# Patient Record
Sex: FEMALE | Race: WHITE | NOT HISPANIC OR LATINO | Employment: FULL TIME | URBAN - METROPOLITAN AREA
[De-identification: names, ages, dates, MRNs, and addresses within clinical notes are randomized per-mention and may not be internally consistent; named-entity substitution may affect disease eponyms.]

---

## 2020-11-28 LAB
DATE OF PROCEDURE: NORMAL
EMPLOYED IN HEALTHCARE: NORMAL
FIRST TEST: YES
HOSPITALIZED: NO
ICU: NO
PREGNANT ?: NORMAL
REQUIRED FOR PROCEDURE/SURGERY?: YES
RESIDES IN CONGREGATE CARE SETTING: NO
SARS-COV-2, PCR: NOT DETECTED
SPECIMEN SOURCE: NORMAL
SYMPTOMATIC AS DEFINED BY THE CDC: NO
UNIVERSITY HOSPITAL EMPLOYEE?: NORMAL

## 2020-12-01 LAB — HCG URINE: NEGATIVE

## 2020-12-08 LAB — SURGICAL PATHOLOGY REPORT: NORMAL

## 2023-04-14 ENCOUNTER — TELEMEDICINE (OUTPATIENT)
Dept: PRIMARY CARE | Facility: CLINIC | Age: 33
End: 2023-04-14
Payer: COMMERCIAL

## 2023-04-14 VITALS — HEIGHT: 71 IN | BODY MASS INDEX: 40.6 KG/M2 | WEIGHT: 290 LBS

## 2023-04-14 DIAGNOSIS — E66.01 MORBID OBESITY WITH BODY MASS INDEX (BMI) OF 40.0 TO 44.9 IN ADULT (MULTI): Primary | ICD-10-CM

## 2023-04-14 PROCEDURE — 99213 OFFICE O/P EST LOW 20 MIN: CPT | Performed by: INTERNAL MEDICINE

## 2023-04-14 RX ORDER — PYRIDOSTIGMINE BROMIDE 60 MG/1
60 TABLET ORAL DAILY
COMMUNITY
End: 2024-01-15

## 2023-04-14 RX ORDER — TIZANIDINE 4 MG/1
4 TABLET ORAL AS NEEDED
COMMUNITY
Start: 2022-01-28 | End: 2023-06-26 | Stop reason: ALTCHOICE

## 2023-04-14 RX ORDER — SERTRALINE HYDROCHLORIDE 50 MG/1
50 TABLET, FILM COATED ORAL DAILY
COMMUNITY
Start: 2020-05-22 | End: 2023-09-12 | Stop reason: DRUGHIGH

## 2023-04-14 RX ORDER — PEN NEEDLE, DIABETIC 31 GX5/16"
NEEDLE, DISPOSABLE MISCELLANEOUS
COMMUNITY
Start: 2023-02-10 | End: 2023-12-20

## 2023-04-14 RX ORDER — LIRAGLUTIDE 6 MG/ML
3 INJECTION, SOLUTION SUBCUTANEOUS DAILY
COMMUNITY
End: 2023-06-19 | Stop reason: SDUPTHER

## 2023-04-14 RX ORDER — FLUDROCORTISONE ACETATE 0.1 MG/1
0.1 TABLET ORAL DAILY
COMMUNITY
End: 2023-12-22 | Stop reason: SDUPTHER

## 2023-04-14 RX ORDER — NAPROXEN 500 MG/1
500 TABLET ORAL AS NEEDED
COMMUNITY
Start: 2020-08-26

## 2023-04-14 RX ORDER — AMITRIPTYLINE HYDROCHLORIDE 10 MG/1
10 TABLET, FILM COATED ORAL DAILY
COMMUNITY
Start: 2017-12-13 | End: 2023-10-31

## 2023-04-14 RX ORDER — FLUTICASONE PROPIONATE AND SALMETEROL 250; 50 UG/1; UG/1
1 POWDER RESPIRATORY (INHALATION) EVERY 12 HOURS
COMMUNITY
Start: 2022-09-02 | End: 2023-12-20

## 2023-04-14 RX ORDER — ALBUTEROL SULFATE 90 UG/1
2 AEROSOL, METERED RESPIRATORY (INHALATION) 4 TIMES DAILY
COMMUNITY
Start: 2022-01-04

## 2023-04-14 ASSESSMENT — PATIENT HEALTH QUESTIONNAIRE - PHQ9
1. LITTLE INTEREST OR PLEASURE IN DOING THINGS: NOT AT ALL
SUM OF ALL RESPONSES TO PHQ9 QUESTIONS 1 AND 2: 0
2. FEELING DOWN, DEPRESSED OR HOPELESS: NOT AT ALL

## 2023-04-14 NOTE — PATIENT INSTRUCTIONS
We will switch to Wegovy,Saxenda hasn't been significantly effective.  Follow up in 3 months.I spent >15 minutes face to face with individual providing recommendations for nutrition choices and exercise plan to help achieve weight reduction.

## 2023-04-14 NOTE — PROGRESS NOTES
"Subjective   Patient ID: Charlotte Crain is a 33 y.o. female who presents for No chief complaint on file..    pt is seen virtually to follow up on obesity,she started Saxenda over 3 months ago tolerated well,and is now on 3 mg dose,,she has been paying out of pocket,her mother helping her out with payment.No side effects.she was only able to loose 10 pounds.she has not been exercising,working in ER night shift and long hours,but will start walking regularly with the nicer weather now.  She is asking ti switch to Wegovy for better effect.  She saw bariatric surgery,they would not cover  the balloon procedure,which she prefer,they will cover the bariatric syrgery,which she is reluctant to do,:she does not nor her mother to loose lots of weight\"  f/u on anxiety,doing well on Sertraline.             Review of Systems   Reason unable to perform ROS: as HPI.       Objective   Ht 1.803 m (5' 11\")   Wt 132 kg (290 lb)   BMI 40.45 kg/m²     Physical Exam  Constitutional:       General: She is not in acute distress.  Pulmonary:      Effort: No respiratory distress.   Neurological:      Mental Status: She is alert.       Assessment/Plan          "

## 2023-04-23 DIAGNOSIS — J22 UNSPECIFIED ACUTE LOWER RESPIRATORY INFECTION: ICD-10-CM

## 2023-04-26 RX ORDER — FLUTICASONE PROPIONATE 50 MCG
SPRAY, SUSPENSION (ML) NASAL
Qty: 16 ML | Refills: 3 | Status: SHIPPED | OUTPATIENT
Start: 2023-04-26 | End: 2023-10-17

## 2023-04-27 ENCOUNTER — TELEPHONE (OUTPATIENT)
Dept: PRIMARY CARE | Facility: CLINIC | Age: 33
End: 2023-04-27
Payer: COMMERCIAL

## 2023-04-27 DIAGNOSIS — L98.9 LEG SORE: Primary | ICD-10-CM

## 2023-04-27 RX ORDER — MUPIROCIN 20 MG/G
OINTMENT TOPICAL 3 TIMES DAILY
Qty: 22 G | Refills: 0 | Status: SHIPPED | OUTPATIENT
Start: 2023-04-27 | End: 2023-05-07

## 2023-04-27 NOTE — TELEPHONE ENCOUNTER
Micaela, patient's mother in calling on behalf of patient. Patient has sores all over leg and her friend that is a nurse advised that she put Mupirocin ointment on them to help. Patient would like an rx prescribed for this. Please advise.

## 2023-05-15 ENCOUNTER — TELEPHONE (OUTPATIENT)
Dept: PRIMARY CARE | Facility: CLINIC | Age: 33
End: 2023-05-15
Payer: COMMERCIAL

## 2023-05-15 NOTE — TELEPHONE ENCOUNTER
Patient got her Weigovie prescription, but it was not covered by her insurance. Patient is following up to see if a prior authorization can be done if possible. Please advise. OK to leave a message.

## 2023-05-21 ENCOUNTER — PATIENT MESSAGE (OUTPATIENT)
Dept: PRIMARY CARE | Facility: CLINIC | Age: 33
End: 2023-05-21
Payer: COMMERCIAL

## 2023-05-21 DIAGNOSIS — R73.09 BLOOD GLUCOSE ABNORMAL: Primary | ICD-10-CM

## 2023-05-26 ENCOUNTER — LAB (OUTPATIENT)
Dept: LAB | Facility: LAB | Age: 33
End: 2023-05-26
Payer: COMMERCIAL

## 2023-05-26 DIAGNOSIS — R73.09 BLOOD GLUCOSE ABNORMAL: ICD-10-CM

## 2023-05-26 LAB
ESTIMATED AVERAGE GLUCOSE FOR HBA1C: 97 MG/DL
FASTING GLUCOSE (MG/DL) IN SER/PLAS: 90 MG/DL (ref 74–99)
HEMOGLOBIN A1C/HEMOGLOBIN TOTAL IN BLOOD: 5 %

## 2023-05-26 PROCEDURE — 82947 ASSAY GLUCOSE BLOOD QUANT: CPT

## 2023-05-26 PROCEDURE — 36415 COLL VENOUS BLD VENIPUNCTURE: CPT

## 2023-05-26 PROCEDURE — 83036 HEMOGLOBIN GLYCOSYLATED A1C: CPT

## 2023-06-19 DIAGNOSIS — E66.01 MORBID OBESITY WITH BODY MASS INDEX (BMI) OF 40.0 TO 44.9 IN ADULT (MULTI): Primary | ICD-10-CM

## 2023-06-19 RX ORDER — LIRAGLUTIDE 6 MG/ML
3 INJECTION, SOLUTION SUBCUTANEOUS DAILY
Qty: 6 ML | Refills: 0 | Status: SHIPPED | OUTPATIENT
Start: 2023-06-19 | End: 2023-06-26 | Stop reason: ALTCHOICE

## 2023-06-26 ENCOUNTER — OFFICE VISIT (OUTPATIENT)
Dept: PRIMARY CARE | Facility: CLINIC | Age: 33
End: 2023-06-26
Payer: COMMERCIAL

## 2023-06-26 VITALS
HEART RATE: 86 BPM | OXYGEN SATURATION: 96 % | DIASTOLIC BLOOD PRESSURE: 84 MMHG | BODY MASS INDEX: 41.02 KG/M2 | HEIGHT: 71 IN | SYSTOLIC BLOOD PRESSURE: 124 MMHG | TEMPERATURE: 97.9 F | WEIGHT: 293 LBS

## 2023-06-26 DIAGNOSIS — J22 ACUTE RESPIRATORY INFECTION: ICD-10-CM

## 2023-06-26 DIAGNOSIS — Z00.00 ANNUAL PHYSICAL EXAM: ICD-10-CM

## 2023-06-26 DIAGNOSIS — G43.809 OTHER MIGRAINE WITHOUT STATUS MIGRAINOSUS, NOT INTRACTABLE: ICD-10-CM

## 2023-06-26 DIAGNOSIS — E66.01 CLASS 3 SEVERE OBESITY DUE TO EXCESS CALORIES WITHOUT SERIOUS COMORBIDITY WITH BODY MASS INDEX (BMI) OF 40.0 TO 44.9 IN ADULT (MULTI): Primary | ICD-10-CM

## 2023-06-26 PROBLEM — M54.2 CERVICALGIA: Status: ACTIVE | Noted: 2023-06-26

## 2023-06-26 PROBLEM — D21.12 BENIGN NEOPLASM OF CONNECTIVE AND OTHER SOFT TISSUE OF LEFT UPPER LIMB, INCLUDING SHOULDER: Status: ACTIVE | Noted: 2020-11-04

## 2023-06-26 PROBLEM — R93.5 ABNORMAL CT OF THE ABDOMEN: Status: ACTIVE | Noted: 2023-06-26

## 2023-06-26 PROBLEM — E66.813 CLASS 3 SEVERE OBESITY DUE TO EXCESS CALORIES WITHOUT SERIOUS COMORBIDITY WITH BODY MASS INDEX (BMI) OF 40.0 TO 44.9 IN ADULT: Status: ACTIVE | Noted: 2023-06-26

## 2023-06-26 PROBLEM — S06.0XAA BRAIN CONCUSSION: Status: ACTIVE | Noted: 2023-06-26

## 2023-06-26 PROBLEM — R10.9 ABDOMINAL PAIN: Status: ACTIVE | Noted: 2023-06-26

## 2023-06-26 PROBLEM — N94.6 DYSMENORRHEA: Status: ACTIVE | Noted: 2023-06-26

## 2023-06-26 PROBLEM — G43.909 MIGRAINE WITHOUT STATUS MIGRAINOSUS, NOT INTRACTABLE: Status: ACTIVE | Noted: 2023-06-26

## 2023-06-26 LAB — POC RAPID STREP: NEGATIVE

## 2023-06-26 PROCEDURE — 3008F BODY MASS INDEX DOCD: CPT | Performed by: INTERNAL MEDICINE

## 2023-06-26 PROCEDURE — 99214 OFFICE O/P EST MOD 30 MIN: CPT | Performed by: INTERNAL MEDICINE

## 2023-06-26 PROCEDURE — 1036F TOBACCO NON-USER: CPT | Performed by: INTERNAL MEDICINE

## 2023-06-26 PROCEDURE — 87880 STREP A ASSAY W/OPTIC: CPT | Performed by: INTERNAL MEDICINE

## 2023-06-26 RX ORDER — TOPIRAMATE 25 MG/1
25 TABLET ORAL 2 TIMES DAILY
Qty: 60 TABLET | Refills: 5 | Status: SHIPPED | OUTPATIENT
Start: 2023-06-26 | End: 2023-08-08

## 2023-06-26 ASSESSMENT — PATIENT HEALTH QUESTIONNAIRE - PHQ9
SUM OF ALL RESPONSES TO PHQ9 QUESTIONS 1 AND 2: 0
1. LITTLE INTEREST OR PLEASURE IN DOING THINGS: NOT AT ALL
2. FEELING DOWN, DEPRESSED OR HOPELESS: NOT AT ALL

## 2023-06-26 NOTE — PROGRESS NOTES
"Subjective   Patient ID: Charlotte Crain is a 33 y.o. female who presents for medication follow up.    C/o sore throat and bilateral ear pain since yesterday,no fever,chills,she had diarrhea last night,no N/V no myalgia.has clear post nasal drainage,she works at [employer] .  Today's covid test was negative today.  She is also here to discuss her weight,she took Saxenda for 6 months and paid out of pocket without relief.  She took Wegovy for 1 month,but no longer available at the pharmacy.  She had liposuction procedure by Dr Atkinson.         Review of Systems   Reason unable to perform ROS: as HPI.       Objective   /84   Pulse 86   Temp 36.6 °C (97.9 °F)   Ht 1.803 m (5' 11\")   Wt 138 kg (305 lb 3.2 oz)   SpO2 96%   BMI 42.57 kg/m²     Physical Exam  Constitutional:       Appearance: Normal appearance.   HENT:      Head: Normocephalic and atraumatic.      Right Ear: Tympanic membrane, ear canal and external ear normal.      Left Ear: Tympanic membrane, ear canal and external ear normal.      Mouth/Throat:      Pharynx: Posterior oropharyngeal erythema present. No oropharyngeal exudate.   Eyes:      Extraocular Movements: Extraocular movements intact.      Pupils: Pupils are equal, round, and reactive to light.   Cardiovascular:      Rate and Rhythm: Normal rate and regular rhythm.      Heart sounds: Normal heart sounds. No murmur heard.  Pulmonary:      Effort: Pulmonary effort is normal.      Breath sounds: Normal breath sounds. No wheezing or rhonchi.   Abdominal:      General: There is no distension.   Musculoskeletal:         General: Normal range of motion.      Cervical back: Normal range of motion and neck supple.      Left lower leg: No edema.   Lymphadenopathy:      Cervical: No cervical adenopathy.   Skin:     General: Skin is warm.   Neurological:      General: No focal deficit present.      Mental Status: She is alert and oriented to person, place, and time.   Psychiatric:         Mood and " Affect: Mood normal.         Behavior: Behavior normal.         Assessment/Plan   Problem List Items Addressed This Visit       Acute respiratory infection     Suspect viral.  Rapid strep done at the office and was negative.  Rest,push fluid,gargle with warm salty water.  Use Flonase,sudafed PRN.  Call if not better.  Use bedside humidifier.         Relevant Orders    POCT Rapid Strep A manually resulted (Completed)    Class 3 severe obesity due to excess calories without serious comorbidity with body mass index (BMI) of 40.0 to 44.9 in adult (CMS/[facility]) - Primary     Continue diet and exercise.  Wegovy on back order.  Will start Topamax,this will also help her migraine and her weight as well.  Follow up in 1 month.  Order labs including thyroid test.         Migraine without status migrainosus, not intractable    Relevant Medications    topiramate (Topamax) 25 mg tablet    Annual physical exam    Relevant Orders    CBC and Auto Differential    Comprehensive Metabolic Panel    Lipid Panel    TSH with reflex to Free T4 if abnormal

## 2023-06-26 NOTE — ASSESSMENT & PLAN NOTE
Continue diet and exercise.  Wegovy on back order.  Will start Topamax,this will also help her migraine and her weight as well.  Follow up in 1 month.  Order labs including thyroid test.

## 2023-06-26 NOTE — ASSESSMENT & PLAN NOTE
Suspect viral.  Rapid strep done at the office and was negative.  Rest,push fluid,gargle with warm salty water.  Use Flonase,sudafed PRN.  Call if not better.  Use bedside humidifier.

## 2023-07-28 ENCOUNTER — APPOINTMENT (OUTPATIENT)
Dept: PRIMARY CARE | Facility: CLINIC | Age: 33
End: 2023-07-28
Payer: COMMERCIAL

## 2023-08-04 ENCOUNTER — LAB (OUTPATIENT)
Dept: LAB | Facility: LAB | Age: 33
End: 2023-08-04
Payer: COMMERCIAL

## 2023-08-04 DIAGNOSIS — Z00.00 ANNUAL PHYSICAL EXAM: ICD-10-CM

## 2023-08-04 LAB
ALANINE AMINOTRANSFERASE (SGPT) (U/L) IN SER/PLAS: 21 U/L (ref 7–45)
ALBUMIN (G/DL) IN SER/PLAS: 4.2 G/DL (ref 3.4–5)
ALKALINE PHOSPHATASE (U/L) IN SER/PLAS: 87 U/L (ref 33–110)
ANION GAP IN SER/PLAS: 13 MMOL/L (ref 10–20)
ASPARTATE AMINOTRANSFERASE (SGOT) (U/L) IN SER/PLAS: 17 U/L (ref 9–39)
BASOPHILS (10*3/UL) IN BLOOD BY AUTOMATED COUNT: 0.06 X10E9/L (ref 0–0.1)
BASOPHILS/100 LEUKOCYTES IN BLOOD BY AUTOMATED COUNT: 0.8 % (ref 0–2)
BILIRUBIN TOTAL (MG/DL) IN SER/PLAS: 0.7 MG/DL (ref 0–1.2)
CALCIUM (MG/DL) IN SER/PLAS: 9.2 MG/DL (ref 8.6–10.3)
CARBON DIOXIDE, TOTAL (MMOL/L) IN SER/PLAS: 24 MMOL/L (ref 21–32)
CHLORIDE (MMOL/L) IN SER/PLAS: 103 MMOL/L (ref 98–107)
CHOLESTEROL (MG/DL) IN SER/PLAS: 174 MG/DL (ref 0–199)
CHOLESTEROL IN HDL (MG/DL) IN SER/PLAS: 57.8 MG/DL
CHOLESTEROL/HDL RATIO: 3
CREATININE (MG/DL) IN SER/PLAS: 0.71 MG/DL (ref 0.5–1.05)
EOSINOPHILS (10*3/UL) IN BLOOD BY AUTOMATED COUNT: 0.04 X10E9/L (ref 0–0.7)
EOSINOPHILS/100 LEUKOCYTES IN BLOOD BY AUTOMATED COUNT: 0.5 % (ref 0–6)
ERYTHROCYTE DISTRIBUTION WIDTH (RATIO) BY AUTOMATED COUNT: 13.2 % (ref 11.5–14.5)
ERYTHROCYTE MEAN CORPUSCULAR HEMOGLOBIN CONCENTRATION (G/DL) BY AUTOMATED: 33.4 G/DL (ref 32–36)
ERYTHROCYTE MEAN CORPUSCULAR VOLUME (FL) BY AUTOMATED COUNT: 87 FL (ref 80–100)
ERYTHROCYTES (10*6/UL) IN BLOOD BY AUTOMATED COUNT: 4.6 X10E12/L (ref 4–5.2)
GFR FEMALE: >90 ML/MIN/1.73M2
GLUCOSE (MG/DL) IN SER/PLAS: 93 MG/DL (ref 74–99)
HEMATOCRIT (%) IN BLOOD BY AUTOMATED COUNT: 40.1 % (ref 36–46)
HEMOGLOBIN (G/DL) IN BLOOD: 13.4 G/DL (ref 12–16)
IMMATURE GRANULOCYTES/100 LEUKOCYTES IN BLOOD BY AUTOMATED COUNT: 0.1 % (ref 0–0.9)
LDL: 95 MG/DL (ref 0–99)
LEUKOCYTES (10*3/UL) IN BLOOD BY AUTOMATED COUNT: 7.6 X10E9/L (ref 4.4–11.3)
LYMPHOCYTES (10*3/UL) IN BLOOD BY AUTOMATED COUNT: 3.89 X10E9/L (ref 1.2–4.8)
LYMPHOCYTES/100 LEUKOCYTES IN BLOOD BY AUTOMATED COUNT: 51 % (ref 13–44)
MONOCYTES (10*3/UL) IN BLOOD BY AUTOMATED COUNT: 0.64 X10E9/L (ref 0.1–1)
MONOCYTES/100 LEUKOCYTES IN BLOOD BY AUTOMATED COUNT: 8.4 % (ref 2–10)
NEUTROPHILS (10*3/UL) IN BLOOD BY AUTOMATED COUNT: 2.99 X10E9/L (ref 1.2–7.7)
NEUTROPHILS/100 LEUKOCYTES IN BLOOD BY AUTOMATED COUNT: 39.2 % (ref 40–80)
NRBC (PER 100 WBCS) BY AUTOMATED COUNT: 0 /100 WBC (ref 0–0)
PLATELETS (10*3/UL) IN BLOOD AUTOMATED COUNT: 244 X10E9/L (ref 150–450)
POTASSIUM (MMOL/L) IN SER/PLAS: 4 MMOL/L (ref 3.5–5.3)
PROTEIN TOTAL: 6.9 G/DL (ref 6.4–8.2)
SODIUM (MMOL/L) IN SER/PLAS: 136 MMOL/L (ref 136–145)
THYROTROPIN (MIU/L) IN SER/PLAS BY DETECTION LIMIT <= 0.05 MIU/L: 3.72 MIU/L (ref 0.44–3.98)
TRIGLYCERIDE (MG/DL) IN SER/PLAS: 107 MG/DL (ref 0–149)
UREA NITROGEN (MG/DL) IN SER/PLAS: 12 MG/DL (ref 6–23)
VLDL: 21 MG/DL (ref 0–40)

## 2023-08-04 PROCEDURE — 80053 COMPREHEN METABOLIC PANEL: CPT

## 2023-08-04 PROCEDURE — 85025 COMPLETE CBC W/AUTO DIFF WBC: CPT

## 2023-08-04 PROCEDURE — 36415 COLL VENOUS BLD VENIPUNCTURE: CPT

## 2023-08-04 PROCEDURE — 84443 ASSAY THYROID STIM HORMONE: CPT

## 2023-08-04 PROCEDURE — 80061 LIPID PANEL: CPT

## 2023-08-08 ENCOUNTER — TELEPHONE (OUTPATIENT)
Dept: PRIMARY CARE | Facility: CLINIC | Age: 33
End: 2023-08-08

## 2023-08-08 ENCOUNTER — OFFICE VISIT (OUTPATIENT)
Dept: PRIMARY CARE | Facility: CLINIC | Age: 33
End: 2023-08-08
Payer: COMMERCIAL

## 2023-08-08 VITALS
TEMPERATURE: 98.2 F | BODY MASS INDEX: 41.02 KG/M2 | OXYGEN SATURATION: 98 % | HEART RATE: 82 BPM | WEIGHT: 293 LBS | HEIGHT: 71 IN | SYSTOLIC BLOOD PRESSURE: 104 MMHG | DIASTOLIC BLOOD PRESSURE: 74 MMHG

## 2023-08-08 DIAGNOSIS — F41.1 GENERALIZED ANXIETY DISORDER: ICD-10-CM

## 2023-08-08 DIAGNOSIS — G43.809 OTHER MIGRAINE WITHOUT STATUS MIGRAINOSUS, NOT INTRACTABLE: ICD-10-CM

## 2023-08-08 DIAGNOSIS — E66.01 CLASS 3 SEVERE OBESITY DUE TO EXCESS CALORIES WITHOUT SERIOUS COMORBIDITY WITH BODY MASS INDEX (BMI) OF 40.0 TO 44.9 IN ADULT (MULTI): Primary | ICD-10-CM

## 2023-08-08 DIAGNOSIS — D69.3 IDIOPATHIC THROMBOCYTOPENIC PURPURA (MULTI): ICD-10-CM

## 2023-08-08 PROBLEM — G43.909 MIGRAINES: Status: ACTIVE | Noted: 2023-08-08

## 2023-08-08 PROBLEM — R10.2 FEMALE PELVIC PAIN: Status: ACTIVE | Noted: 2023-08-08

## 2023-08-08 PROBLEM — E66.9 OBESITY: Status: ACTIVE | Noted: 2023-08-08

## 2023-08-08 PROBLEM — R42 POSTURAL DIZZINESS: Status: ACTIVE | Noted: 2023-08-08

## 2023-08-08 PROBLEM — R51.9 CHRONIC HEADACHES: Status: ACTIVE | Noted: 2023-08-08

## 2023-08-08 PROBLEM — R10.31 RIGHT GROIN PAIN: Status: ACTIVE | Noted: 2023-08-08

## 2023-08-08 PROBLEM — N80.9 ENDOMETRIOSIS: Status: ACTIVE | Noted: 2018-07-19

## 2023-08-08 PROBLEM — N92.6 IRREGULAR MENSTRUATION: Status: ACTIVE | Noted: 2023-08-08

## 2023-08-08 PROBLEM — R20.9 SENSORY DISTURBANCE: Status: ACTIVE | Noted: 2023-08-08

## 2023-08-08 PROBLEM — S93.419A SPRAIN OF CALCANEOFIBULAR LIGAMENT: Status: ACTIVE | Noted: 2021-08-26

## 2023-08-08 PROBLEM — R16.0 ENLARGED LIVER: Status: ACTIVE | Noted: 2023-08-08

## 2023-08-08 PROBLEM — R09.81 NASAL CONGESTION: Status: ACTIVE | Noted: 2023-08-08

## 2023-08-08 PROBLEM — R06.2 WHEEZING: Status: ACTIVE | Noted: 2023-08-08

## 2023-08-08 PROBLEM — J02.9 SORE THROAT: Status: ACTIVE | Noted: 2023-08-08
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
## 2023-08-08 PROBLEM — R55 VASOVAGAL EPISODE: Status: ACTIVE | Noted: 2023-08-08

## 2023-08-08 PROBLEM — N91.0 MENSTRUATION DELAY: Status: ACTIVE | Noted: 2023-08-08

## 2023-08-08 PROBLEM — D21.10: Status: ACTIVE | Noted: 2020-11-04

## 2023-08-08 PROBLEM — G43.829 MENSTRUAL MIGRAINE WITHOUT STATUS MIGRAINOSUS, NOT INTRACTABLE: Status: ACTIVE | Noted: 2023-08-08

## 2023-08-08 PROBLEM — L08.9 SKIN INFECTION: Status: ACTIVE | Noted: 2023-08-08

## 2023-08-08 PROBLEM — G60.9 IDIOPATHIC PERIPHERAL NEUROPATHY: Status: ACTIVE | Noted: 2023-08-08

## 2023-08-08 PROBLEM — R91.1 INCIDENTAL LUNG NODULE, LESS THAN OR EQUAL TO 3MM: Status: ACTIVE | Noted: 2023-08-08

## 2023-08-08 PROBLEM — R31.0 GROSS HEMATURIA: Status: ACTIVE | Noted: 2023-08-08

## 2023-08-08 PROBLEM — R53.83 FATIGUE: Status: ACTIVE | Noted: 2023-08-08

## 2023-08-08 PROBLEM — N93.9 ABNORMAL UTERINE BLEEDING (AUB): Status: ACTIVE | Noted: 2023-08-08

## 2023-08-08 PROBLEM — E66.813 OBESITY, CLASS III, BMI 40-49.9 (MORBID OBESITY): Status: ACTIVE | Noted: 2018-08-03

## 2023-08-08 PROBLEM — R05.9 COUGH: Status: ACTIVE | Noted: 2023-08-08

## 2023-08-08 PROBLEM — B37.0 ORAL THRUSH: Status: ACTIVE | Noted: 2023-08-08

## 2023-08-08 PROBLEM — I95.1 ORTHOSTATIC HYPOTENSION: Status: ACTIVE | Noted: 2023-08-08

## 2023-08-08 PROBLEM — J30.2 SEASONAL ALLERGIC RHINITIS: Status: ACTIVE | Noted: 2023-08-08

## 2023-08-08 PROBLEM — B08.4 HAND, FOOT AND MOUTH DISEASE (HFMD): Status: ACTIVE | Noted: 2023-08-08

## 2023-08-08 PROBLEM — M54.50 LOW BACK PAIN: Status: ACTIVE | Noted: 2023-08-08

## 2023-08-08 PROBLEM — S06.0XAA CONCUSSION: Status: ACTIVE | Noted: 2023-08-08

## 2023-08-08 PROBLEM — G90.A POSTURAL ORTHOSTATIC TACHYCARDIA SYNDROME: Status: ACTIVE | Noted: 2023-08-08

## 2023-08-08 PROBLEM — R22.32 MASS OF LEFT UPPER EXTREMITY: Status: ACTIVE | Noted: 2023-08-08

## 2023-08-08 PROBLEM — J01.90 ACUTE SINUSITIS: Status: ACTIVE | Noted: 2023-08-08

## 2023-08-08 PROBLEM — G89.29 CHRONIC HEADACHES: Status: ACTIVE | Noted: 2023-08-08

## 2023-08-08 PROBLEM — N76.0 VAGINITIS: Status: ACTIVE | Noted: 2023-08-08

## 2023-08-08 PROBLEM — J30.9 ALLERGIC RHINITIS: Status: ACTIVE | Noted: 2023-08-08

## 2023-08-08 PROBLEM — R00.0 TACHYCARDIA: Status: ACTIVE | Noted: 2023-08-08

## 2023-08-08 PROCEDURE — 3008F BODY MASS INDEX DOCD: CPT | Performed by: INTERNAL MEDICINE

## 2023-08-08 PROCEDURE — 1036F TOBACCO NON-USER: CPT | Performed by: INTERNAL MEDICINE

## 2023-08-08 PROCEDURE — 99214 OFFICE O/P EST MOD 30 MIN: CPT | Performed by: INTERNAL MEDICINE

## 2023-08-08 RX ORDER — PHENTERMINE HYDROCHLORIDE 30 MG/1
30 CAPSULE ORAL
Qty: 30 CAPSULE | Refills: 0 | Status: SHIPPED | OUTPATIENT
Start: 2023-08-08 | End: 2023-09-12 | Stop reason: SDUPTHER

## 2023-08-08 ASSESSMENT — PATIENT HEALTH QUESTIONNAIRE - PHQ9
SUM OF ALL RESPONSES TO PHQ9 QUESTIONS 1 AND 2: 0
2. FEELING DOWN, DEPRESSED OR HOPELESS: NOT AT ALL
1. LITTLE INTEREST OR PLEASURE IN DOING THINGS: NOT AT ALL

## 2023-08-08 NOTE — ASSESSMENT & PLAN NOTE
Start phetermin.  CS agreement signed today.  OARRS report checked and verified.  Follow up in 5 weeks.I spent >15 minutes face to face with individual providing recommendations for nutrition choices and exercise plan to help achieve weight reduction.

## 2023-08-08 NOTE — TELEPHONE ENCOUNTER
Pt of Dr Sosa's. Was just in and needed a 5 week follow up. She works for [employer] with the night shift and states an afternoon shift would work better. She did need to look at her schedule still. Please advise a possible day/time for her. Thank you!

## 2023-08-08 NOTE — PROGRESS NOTES
"Subjective   Patient ID: Charlotte Crain is a 33 y.o. female who presents for 1 month follow up.    Here to follow up on BMI.  She has not been responding to Topamax,she gained weight,  In past Saxenda helped better than Wegovy,but was too expensive and sometimes was unavailable at the pharmacy.  She is reluctant to have bariatric surgery.  She works long hours in ER.  She had liposuction procedure by Dr Atkinson.         Review of Systems   Reason unable to perform ROS: as HPI.       Objective   /74 (BP Location: Left arm, Patient Position: Sitting, BP Cuff Size: Large adult)   Pulse 82   Temp 36.8 °C (98.2 °F) (Temporal)   Ht 1.803 m (5' 11\")   Wt 140 kg (308 lb)   SpO2 98%   BMI 42.96 kg/m²     Physical Exam  Constitutional:       Appearance: Normal appearance.   HENT:      Head: Normocephalic and atraumatic.   Eyes:      Extraocular Movements: Extraocular movements intact.      Pupils: Pupils are equal, round, and reactive to light.   Cardiovascular:      Rate and Rhythm: Normal rate and regular rhythm.      Heart sounds: Normal heart sounds.   Pulmonary:      Effort: Pulmonary effort is normal.      Breath sounds: Normal breath sounds. No wheezing or rhonchi.   Abdominal:      General: Abdomen is flat. Bowel sounds are normal. There is no distension.      Palpations: Abdomen is soft.   Musculoskeletal:         General: Normal range of motion.      Cervical back: Normal range of motion and neck supple.      Right lower leg: No edema.      Left lower leg: No edema.   Skin:     General: Skin is warm.   Neurological:      General: No focal deficit present.      Mental Status: She is alert and oriented to person, place, and time.   Psychiatric:         Mood and Affect: Mood normal.         Behavior: Behavior normal.         Assessment/Plan   Problem List Items Addressed This Visit       Class 3 severe obesity due to excess calories without serious comorbidity with body mass index (BMI) of 40.0 to 44.9 " in adult (CMS/HCC) - Primary    Relevant Medications    phentermine 30 mg capsule    Migraine without status migrainosus, not intractable    Generalized anxiety disorder     Stable on current med.         Idiopathic thrombocytopenic purpura (CMS/HCC)

## 2023-08-11 ENCOUNTER — APPOINTMENT (OUTPATIENT)
Dept: PRIMARY CARE | Facility: CLINIC | Age: 33
End: 2023-08-11
Payer: COMMERCIAL

## 2023-09-12 ENCOUNTER — TELEMEDICINE (OUTPATIENT)
Dept: PRIMARY CARE | Facility: CLINIC | Age: 33
End: 2023-09-12
Payer: COMMERCIAL

## 2023-09-12 VITALS — DIASTOLIC BLOOD PRESSURE: 64 MMHG | SYSTOLIC BLOOD PRESSURE: 119 MMHG

## 2023-09-12 DIAGNOSIS — F41.1 GENERALIZED ANXIETY DISORDER: Primary | ICD-10-CM

## 2023-09-12 DIAGNOSIS — E66.01 CLASS 3 SEVERE OBESITY DUE TO EXCESS CALORIES WITHOUT SERIOUS COMORBIDITY WITH BODY MASS INDEX (BMI) OF 40.0 TO 44.9 IN ADULT (MULTI): ICD-10-CM

## 2023-09-12 PROCEDURE — 99213 OFFICE O/P EST LOW 20 MIN: CPT | Performed by: INTERNAL MEDICINE

## 2023-09-12 RX ORDER — PHENTERMINE HYDROCHLORIDE 30 MG/1
30 CAPSULE ORAL
Qty: 30 CAPSULE | Refills: 1 | Status: SHIPPED | OUTPATIENT
Start: 2023-09-12 | End: 2023-11-09

## 2023-09-12 RX ORDER — SERTRALINE HYDROCHLORIDE 100 MG/1
100 TABLET, FILM COATED ORAL DAILY
Qty: 90 TABLET | Refills: 3 | Status: SHIPPED | OUTPATIENT
Start: 2023-09-12 | End: 2024-09-11

## 2023-09-12 NOTE — PROGRESS NOTES
Subjective   Patient ID: Charlotte Crain is a 33 y.o. female who presents for Follow-up.    An interactive audio and video telecommunication system with patient real time communications between the patient (at the original site) and provider (at the distant site) was utilized to provide this telehealth service.  Verbal consent was requested and obtained from the patient at this date for a telehealth.  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  She has anxiety,on Sertraline 50 mg daily,but will increase up to 100 mg daily.  No headache.         Review of Systems   Constitutional:         As HPI.       Objective   There were no vitals taken for this visit.    Physical Exam  Constitutional:       General: She is not in acute distress.     Appearance: She is obese.   Pulmonary:      Effort: No respiratory distress.   Neurological:      Mental Status: She is alert.         Assessment/Plan   Problem List Items Addressed This Visit       Class 3 severe obesity due to excess calories without serious comorbidity with body mass index (BMI) of 40.0 to 44.9 in adult (CMS/[facility])     BMI improving with phentermine.  Continue same.  Reinforce regular exercise and calories restriction.         Relevant Medications    phentermine 30 mg capsule    Generalized anxiety disorder - Primary     Was anxious even prior to starting Phentermine due to work and school.  Will increase Sertraline up to 100 mg daily.  Follow up in 2 months.         Relevant Medications    sertraline (Zoloft) 100 mg tablet

## 2023-09-12 NOTE — ASSESSMENT & PLAN NOTE
Was anxious even prior to starting Phentermine due to work and school.  Will increase Sertraline up to 100 mg daily.  Follow up in 2 months.

## 2023-09-12 NOTE — ASSESSMENT & PLAN NOTE
BMI improving with phentermine.  Continue same.  Reinforce regular exercise and calories restriction.

## 2023-10-17 DIAGNOSIS — J22 UNSPECIFIED ACUTE LOWER RESPIRATORY INFECTION: ICD-10-CM

## 2023-10-17 RX ORDER — FLUTICASONE PROPIONATE 50 MCG
SPRAY, SUSPENSION (ML) NASAL
Qty: 48 ML | Refills: 1 | Status: SHIPPED | OUTPATIENT
Start: 2023-10-17

## 2023-10-31 DIAGNOSIS — G43.909 MIGRAINE, UNSPECIFIED, NOT INTRACTABLE, WITHOUT STATUS MIGRAINOSUS: ICD-10-CM

## 2023-10-31 RX ORDER — AMITRIPTYLINE HYDROCHLORIDE 10 MG/1
TABLET, FILM COATED ORAL
Qty: 270 TABLET | Refills: 3 | Status: SHIPPED | OUTPATIENT
Start: 2023-10-31

## 2023-11-09 DIAGNOSIS — E66.01 CLASS 3 SEVERE OBESITY DUE TO EXCESS CALORIES WITHOUT SERIOUS COMORBIDITY WITH BODY MASS INDEX (BMI) OF 40.0 TO 44.9 IN ADULT (MULTI): ICD-10-CM

## 2023-11-09 RX ORDER — PHENTERMINE HYDROCHLORIDE 30 MG/1
30 CAPSULE ORAL
Qty: 30 CAPSULE | Refills: 0 | Status: SHIPPED | OUTPATIENT
Start: 2023-11-09 | End: 2024-03-25

## 2023-12-03 DIAGNOSIS — N80.9 ENDOMETRIOSIS: Primary | ICD-10-CM

## 2023-12-04 RX ORDER — NORETHINDRONE 5 MG/1
5 TABLET ORAL DAILY
Qty: 30 TABLET | Refills: 1 | Status: SHIPPED | OUTPATIENT
Start: 2023-12-04

## 2023-12-15 DIAGNOSIS — E66.01 CLASS 3 SEVERE OBESITY DUE TO EXCESS CALORIES WITHOUT SERIOUS COMORBIDITY WITH BODY MASS INDEX (BMI) OF 40.0 TO 44.9 IN ADULT (MULTI): ICD-10-CM

## 2023-12-15 DIAGNOSIS — I49.8 OTHER SPECIFIED CARDIAC ARRHYTHMIAS: ICD-10-CM

## 2023-12-18 RX ORDER — PHENTERMINE HYDROCHLORIDE 30 MG/1
30 CAPSULE ORAL
Qty: 30 CAPSULE | Refills: 0 | OUTPATIENT
Start: 2023-12-18 | End: 2024-03-17

## 2023-12-18 RX ORDER — FLUDROCORTISONE ACETATE 0.1 MG/1
0.1 TABLET ORAL DAILY
Qty: 90 TABLET | Refills: 3 | OUTPATIENT
Start: 2023-12-18

## 2023-12-21 ENCOUNTER — TELEPHONE (OUTPATIENT)
Dept: PRIMARY CARE | Facility: CLINIC | Age: 33
End: 2023-12-21
Payer: COMMERCIAL

## 2023-12-21 DIAGNOSIS — I95.1 ORTHOSTATIC HYPOTENSION: Primary | ICD-10-CM

## 2023-12-21 NOTE — TELEPHONE ENCOUNTER
Ok for Friday - pt needs refill for fludrocortisone - sent to [id number] [place]

## 2023-12-22 RX ORDER — FLUDROCORTISONE ACETATE 0.1 MG/1
0.1 TABLET ORAL DAILY
Qty: 90 TABLET | Refills: 3 | Status: SHIPPED | OUTPATIENT
Start: 2023-12-22 | End: 2024-12-21

## 2024-01-13 DIAGNOSIS — I49.8 OTHER SPECIFIED CARDIAC ARRHYTHMIAS: ICD-10-CM

## 2024-01-15 RX ORDER — PYRIDOSTIGMINE BROMIDE 60 MG/1
TABLET ORAL
Qty: 90 TABLET | Refills: 0 | Status: SHIPPED | OUTPATIENT
Start: 2024-01-15 | End: 2024-03-26

## 2024-03-25 ENCOUNTER — TELEMEDICINE (OUTPATIENT)
Dept: PRIMARY CARE | Facility: CLINIC | Age: 34
End: 2024-03-25
Payer: COMMERCIAL

## 2024-03-25 VITALS — DIASTOLIC BLOOD PRESSURE: 72 MMHG | SYSTOLIC BLOOD PRESSURE: 113 MMHG

## 2024-03-25 DIAGNOSIS — J01.00 ACUTE NON-RECURRENT MAXILLARY SINUSITIS: Primary | ICD-10-CM

## 2024-03-25 DIAGNOSIS — D69.3 IDIOPATHIC THROMBOCYTOPENIC PURPURA (MULTI): ICD-10-CM

## 2024-03-25 PROBLEM — M76.829 TIBIALIS TENDINITIS: Status: ACTIVE | Noted: 2024-01-09

## 2024-03-25 PROBLEM — M25.572 PAIN IN LEFT ANKLE AND JOINTS OF LEFT FOOT: Status: ACTIVE | Noted: 2023-10-17

## 2024-03-25 PROCEDURE — 1036F TOBACCO NON-USER: CPT | Performed by: INTERNAL MEDICINE

## 2024-03-25 PROCEDURE — 99213 OFFICE O/P EST LOW 20 MIN: CPT | Performed by: INTERNAL MEDICINE

## 2024-03-25 PROCEDURE — 3008F BODY MASS INDEX DOCD: CPT | Performed by: INTERNAL MEDICINE

## 2024-03-25 RX ORDER — AMOXICILLIN AND CLAVULANATE POTASSIUM 875; 125 MG/1; MG/1
875 TABLET, FILM COATED ORAL 2 TIMES DAILY
Qty: 20 TABLET | Refills: 0 | Status: SHIPPED | OUTPATIENT
Start: 2024-03-25 | End: 2024-03-25 | Stop reason: SINTOL

## 2024-03-25 RX ORDER — AZITHROMYCIN 250 MG/1
TABLET, FILM COATED ORAL
Qty: 6 TABLET | Refills: 0 | Status: SHIPPED | OUTPATIENT
Start: 2024-03-25 | End: 2024-03-30

## 2024-03-25 RX ORDER — MELOXICAM 7.5 MG/1
7.5 TABLET ORAL DAILY
COMMUNITY
Start: 2024-02-08

## 2024-03-25 RX ORDER — HYDROXYZINE HYDROCHLORIDE 25 MG/1
25 TABLET, FILM COATED ORAL 3 TIMES DAILY PRN
COMMUNITY
Start: 2023-09-13

## 2024-03-25 ASSESSMENT — PATIENT HEALTH QUESTIONNAIRE - PHQ9
1. LITTLE INTEREST OR PLEASURE IN DOING THINGS: NOT AT ALL
2. FEELING DOWN, DEPRESSED OR HOPELESS: NOT AT ALL
SUM OF ALL RESPONSES TO PHQ9 QUESTIONS 1 AND 2: 0

## 2024-03-25 NOTE — PROGRESS NOTES
Subjective   Patient ID: Charlotte Crain is a 33 y.o. female who presents for Sinusitis (Patient complains of a possible sinus infection, a sore throat, sinus headache, and vomiting. Patient took an at home COVID test this morning and it was negative. ).    An interactive audio and video telecommunication system with patient real time communications between the patient (at the original site) and provider (at the distant site) was utilized to provide this telehealth service.  Verbal consent was requested and obtained from the patient at this date for a telehealth.  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         Review of Systems   HENT:  Positive for congestion, sinus pressure and sore throat.    Respiratory:  Positive for cough.    Gastrointestinal:  Positive for vomiting.       Objective   /72     Physical Exam  Constitutional:       General: She is not in acute distress.  Pulmonary:      Effort: No respiratory distress.   Neurological:      Mental Status: She is alert.         Assessment/Plan   Problem List Items Addressed This Visit             ICD-10-CM    Idiopathic thrombocytopenic purpura (CMS/HCC) D69.3     Platelet count stable continue to monitor no sign of bleeding.          Acute non-recurrent maxillary sinusitis - Primary J01.00     Will treat patient with a Z-Alejandro, apparently patient did not tolerate doxycycline in the past she had thrush and her mother told me with amoxicillin as a baby she did have some rash but I told her down the road maybe we will do referral to allergist to see if she really had any reaction to amoxicillin also she had reaction to cephalexin in the  past,.  Therefore we will go will treat with a Z-Alejandro Flonase nasal spray and Mucinex.  Take half steamy shower.  Call in 1 week if not better.         Relevant Medications    azithromycin (Zithromax) 250 mg tablet

## 2024-03-26 DIAGNOSIS — I49.8 OTHER SPECIFIED CARDIAC ARRHYTHMIAS: ICD-10-CM

## 2024-03-26 RX ORDER — PYRIDOSTIGMINE BROMIDE 60 MG/1
TABLET ORAL
Qty: 90 TABLET | Refills: 0 | Status: SHIPPED | OUTPATIENT
Start: 2024-03-26

## 2024-03-26 ASSESSMENT — ENCOUNTER SYMPTOMS
SINUS PRESSURE: 1
VOMITING: 1
COUGH: 1
SORE THROAT: 1

## 2024-03-26 NOTE — ASSESSMENT & PLAN NOTE
Will treat patient with a Z-Alejandro, apparently patient did not tolerate doxycycline in the past she had thrush and her mother told me with amoxicillin as a baby she did have some rash but I told her down the road maybe we will do referral to allergist to see if she really had any reaction to amoxicillin also she had reaction to cephalexin in the past,.  Therefore we will go will treat with a Z-Alejandro Flonase nasal spray and Mucinex.  Take half steamy shower.  Call in 1 week if not better.

## 2024-04-16 ENCOUNTER — TELEPHONE (OUTPATIENT)
Dept: PRIMARY CARE | Facility: CLINIC | Age: 34
End: 2024-04-16
Payer: COMMERCIAL

## 2024-04-16 PROBLEM — Z86.2 HISTORY OF BLOOD DISORDER: Status: ACTIVE | Noted: 2024-04-16

## 2024-04-16 PROBLEM — Z20.822 CONTACT WITH AND (SUSPECTED) EXPOSURE TO COVID-19: Status: ACTIVE | Noted: 2023-06-29

## 2024-04-16 PROBLEM — R52 ACUTE PAIN: Status: ACTIVE | Noted: 2024-04-16

## 2024-04-16 PROBLEM — I10 HYPERTENSION: Status: ACTIVE | Noted: 2024-04-16

## 2024-04-16 NOTE — TELEPHONE ENCOUNTER
Patient is scheduled for surgery asking for last office note & history physical EKG     Please fax to [phone]

## 2024-04-30 DIAGNOSIS — M62.838 MUSCLE SPASM: Primary | ICD-10-CM

## 2024-04-30 RX ORDER — METHOCARBAMOL 500 MG/1
500 TABLET, FILM COATED ORAL 4 TIMES DAILY PRN
Qty: 28 TABLET | Refills: 0 | Status: SHIPPED | OUTPATIENT
Start: 2024-04-30 | End: 2024-05-07

## 2024-06-19 DIAGNOSIS — I49.8 OTHER SPECIFIED CARDIAC ARRHYTHMIAS: ICD-10-CM

## 2024-06-20 RX ORDER — PYRIDOSTIGMINE BROMIDE 60 MG/1
TABLET ORAL
Qty: 90 TABLET | Refills: 3 | Status: SHIPPED | OUTPATIENT
Start: 2024-06-20

## 2024-07-24 ENCOUNTER — HOSPITAL ENCOUNTER (OUTPATIENT)
Dept: RADIOLOGY | Facility: CLINIC | Age: 34
Discharge: HOME | End: 2024-07-24
Payer: COMMERCIAL

## 2024-07-24 ENCOUNTER — OFFICE VISIT (OUTPATIENT)
Dept: ORTHOPEDIC SURGERY | Facility: CLINIC | Age: 34
End: 2024-07-24
Payer: COMMERCIAL

## 2024-07-24 VITALS — WEIGHT: 270 LBS | BODY MASS INDEX: 38.65 KG/M2 | HEIGHT: 70 IN

## 2024-07-24 DIAGNOSIS — M25.572 LEFT ANKLE PAIN, UNSPECIFIED CHRONICITY: ICD-10-CM

## 2024-07-24 DIAGNOSIS — M21.42 PES PLANUS OF LEFT FOOT: Primary | ICD-10-CM

## 2024-07-24 DIAGNOSIS — M79.672 FOOT PAIN, LEFT: ICD-10-CM

## 2024-07-24 PROCEDURE — 99204 OFFICE O/P NEW MOD 45 MIN: CPT | Performed by: ORTHOPAEDIC SURGERY

## 2024-07-24 PROCEDURE — 3008F BODY MASS INDEX DOCD: CPT | Performed by: ORTHOPAEDIC SURGERY

## 2024-07-24 PROCEDURE — 1036F TOBACCO NON-USER: CPT | Performed by: ORTHOPAEDIC SURGERY

## 2024-07-24 PROCEDURE — 73610 X-RAY EXAM OF ANKLE: CPT | Mod: LEFT SIDE | Performed by: ORTHOPAEDIC SURGERY

## 2024-07-24 PROCEDURE — 73610 X-RAY EXAM OF ANKLE: CPT | Mod: LT

## 2024-07-24 PROCEDURE — 99214 OFFICE O/P EST MOD 30 MIN: CPT | Mod: 57 | Performed by: ORTHOPAEDIC SURGERY

## 2024-07-24 PROCEDURE — 73630 X-RAY EXAM OF FOOT: CPT | Mod: LT

## 2024-07-24 PROCEDURE — 73630 X-RAY EXAM OF FOOT: CPT | Mod: LEFT SIDE | Performed by: ORTHOPAEDIC SURGERY

## 2024-07-24 NOTE — PROGRESS NOTES
History of Present Illness:   Patient presents today for evaluation of her left ankle pain without known injury.  This pains been present for the last several months, she has mostly dorsal foot pain and some medial pain. She denies any obvious paresthesias. She has noted some years of pain off and on but is worsening over the past several months.  She notes a decline in function as she is very physically active with her job.  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  She endorses significant increasing foot pain as well as time is gone on.  She received several pain from outside orthopedic divider as well as recent MRI that she has a report on her phone.  She went to discuss second opinion today regarding overall treatment of her foot and ankle.    Past Medical History:   Diagnosis Date    Acute frontal sinusitis, unspecified 06/03/2013    Acute frontal sinusitis    Acute pharyngitis, unspecified 06/11/2013    Sore throat    Allergic     Anxiety     Encounter for insertion of intrauterine contraceptive device     Encounter for insertion of mirena IUD    Encounter for other preprocedural examination 01/29/2015    Preop testing    Endometriosis, unspecified 03/01/2022    Endometriosis    Irregular menstruation, unspecified 11/16/2012    Irregular menstrual cycle    Other conditions influencing health status     Pott's Disease    Pain, unspecified 04/22/2013    Acute pain    Personal history of diseases of the blood and blood-forming organs and certain disorders involving the immune mechanism 03/25/2014    History of thrombocytopenia    Personal history of diseases of the skin and subcutaneous tissue 11/16/2012    History of folliculitis    Personal history of other diseases of the nervous system and sense organs 10/08/2015    History of migraine    Personal history of other diseases of the respiratory system 01/17/2014    History of upper respiratory infection    Personal history of other  diseases of the respiratory system 08/23/2022    History of sore throat    Personal history of other diseases of the respiratory system     History of sore throat    Personal history of other infectious and parasitic diseases     History of infectious mononucleosis    Personal history of other specified conditions 09/09/2016    History of headache     Past Surgical History:   Procedure Laterality Date    ANKLE SURGERY  11/16/2012    Ankle Surgery    ELBOW SURGERY  11/16/2012    Elbow Surgery    LAPAROSCOPY DIAGNOSTIC / BIOPSY / ASPIRATION / LYSIS  03/01/2016    Laparoscopy (Diagnostic)    TONSILLECTOMY  11/16/2012    Tonsillectomy       Current Outpatient Medications:     albuterol 90 mcg/actuation inhaler, Inhale 2 puffs 4 times a day. PRN, Disp: , Rfl:     amitriptyline (Elavil) 10 mg tablet, TAKE 3 TABLETS BY MOUTH AT NIGHT, Disp: 270 tablet, Rfl: 3    fludrocortisone (Florinef) 0.1 mg tablet, Take 1 tablet (0.1 mg) by mouth once daily., Disp: 90 tablet, Rfl: 3    fluticasone (Flonase) 50 mcg/actuation nasal spray, SPRAY 2 SPRAYS INTO EACH NOSTRIL EVERY DAY, Disp: 48 mL, Rfl: 1    hydrOXYzine HCL (Atarax) 25 mg tablet, Take 1 tablet (25 mg) by mouth 3 times a day as needed., Disp: , Rfl:     meloxicam (Mobic) 7.5 mg tablet, Take 1 tablet (7.5 mg) by mouth once daily., Disp: , Rfl:     methocarbamol (Robaxin) 500 mg tablet, Take 1 tablet (500 mg) by mouth 4 times a day as needed for muscle spasms for up to 7 days., Disp: 28 tablet, Rfl: 0    naproxen (Naprosyn) 500 mg tablet, Take 1 tablet (500 mg) by mouth if needed., Disp: , Rfl:     norethindrone (Aygestin) 5 mg tablet, TAKE 1 TABLET DAILY, Disp: 30 tablet, Rfl: 1    phentermine 30 mg capsule, TAKE 1 CAPSULE (30 MG) BY MOUTH ONCE DAILY IN THE MORNING. TAKE BEFORE MEALS., Disp: 30 capsule, Rfl: 0    pyridostigmine (Mestinon) 60 mg tablet, TAKE 0.5 TABLET TWICE DAILY IF NEEDED, Disp: 90 tablet, Rfl: 3    sertraline (Zoloft) 100 mg tablet, Take 1 tablet (100 mg)  by mouth once daily., Disp: 90 tablet, Rfl: 3    Review of Systems   GENERAL: Negative  GI: Negative  MUSCULOSKELETAL: See HPI  SKIN: Negative  NEURO:  Negative    Physical Examination:  Left ankle:     Skin healthy and intact  Swelling: None  No appreciable effusion   No significant pes planus or cavus  Plantar flexion, dorsiflexion, and inversion/eversion symmetric with contralateral side     No tenderness to palpation over lateral ligamentous complex  No tenderness to palpation over deltoid ligament  Mild tenderness to palpation over lateral malleolus  Mild tenderness to palpation over medial malleolus  There is tenderness to palpation over the dorsal foot with weakness to resisted dorsiflexion  No tenderness to palpation over achilles tendon     No weakness with resisted dorsiflexion, plantar flexion, inversion or eversion  Negative squeeze test  Negative talar tilt  Negative drawer test  Unable to perform single leg heel raise      Imaging:  Plain films today of the left ankle reveal moderate evidence of DJD of the ankle namely along the medial aspect the medial malleolus with osteophyte formation.  There is an os trigonum noted posteriorly.  MRI report from outside facility does reveal split tearing of the posterior tibialis tendon that could represent an anatomic variant versus chronic abnormality.  There is also evidence of sinus Tarsi injury.    Assessment:   Patient with left ankle pain likely sequelae of posterior tibial insufficiency Tristian syndrome versus split tear of the tendon    Plan:  We reviewed a variety of treatment options for the patient's symptoms today including rest, ice, anti-inflammatories as well as physical therapy for pes planus/posterior tibial insufficiency syndrome.  We reviewed with the patient that the split tear to this tendon may have further consequences and may require higher level discussion with the foot and ankle specialist.  Will provide her some information today of the  specialist we recommend.  We also discussed custom orthotics to aid with pes planus, she is agreeable to this.  Continue home NSAIDs, discussed prescription NSAIDs.  Also discussed possibility of corticosteroid injection intra-articular to the ankle as well as ultrasound-guided injection to the tibial tendon.    Derek Mirza PA-C     In a face to face encounter, I evaluated the patient and performed a physical examination, discussed pertinent diagnostic studies if indicated and discussed diagnosis and management strategies with both the patient and physician assistant / nurse practitioner.  I reviewed the PA/NP's note and agree with the documented findings and plan of care.    Discussed with the patient she does have clinical and MRI evidence of tearing of her posterior tibial tendon.  She has chronic pes planus.    We discussed a variety of treatment options, including orthotics and physical therapy to start.  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    Regarding reconstructive surgery calcaneal slide etc. we reviewed prolonged recovery and some of the risks and benefits of surgery but due to further conversation to foot and ankle specialist.    Yoel Parekh MD

## 2024-09-27 DIAGNOSIS — F41.1 GENERALIZED ANXIETY DISORDER: ICD-10-CM

## 2024-09-27 RX ORDER — SERTRALINE HYDROCHLORIDE 100 MG/1
100 TABLET, FILM COATED ORAL DAILY
Qty: 90 TABLET | Refills: 0 | Status: SHIPPED | OUTPATIENT
Start: 2024-09-27

## 2024-11-10 ENCOUNTER — HOSPITAL ENCOUNTER (EMERGENCY)
Facility: HOSPITAL | Age: 34
Discharge: HOME | End: 2024-11-10
Attending: STUDENT IN AN ORGANIZED HEALTH CARE EDUCATION/TRAINING PROGRAM
Payer: COMMERCIAL

## 2024-11-10 ENCOUNTER — APPOINTMENT (OUTPATIENT)
Dept: RADIOLOGY | Facility: HOSPITAL | Age: 34
End: 2024-11-10
Payer: COMMERCIAL

## 2024-11-10 ENCOUNTER — APPOINTMENT (OUTPATIENT)
Dept: CARDIOLOGY | Facility: HOSPITAL | Age: 34
End: 2024-11-10
Payer: COMMERCIAL

## 2024-11-10 VITALS
WEIGHT: 250 LBS | SYSTOLIC BLOOD PRESSURE: 116 MMHG | TEMPERATURE: 97.5 F | DIASTOLIC BLOOD PRESSURE: 69 MMHG | RESPIRATION RATE: 18 BRPM | BODY MASS INDEX: 35 KG/M2 | HEIGHT: 71 IN | OXYGEN SATURATION: 99 % | HEART RATE: 80 BPM

## 2024-11-10 DIAGNOSIS — J32.9 SINUSITIS, UNSPECIFIED CHRONICITY, UNSPECIFIED LOCATION: Primary | ICD-10-CM

## 2024-11-10 LAB
ALBUMIN SERPL BCP-MCNC: 4.3 G/DL (ref 3.4–5)
ALP SERPL-CCNC: 78 U/L (ref 33–110)
ALT SERPL W P-5'-P-CCNC: 32 U/L (ref 7–45)
ANION GAP SERPL CALC-SCNC: 10 MMOL/L (ref 10–20)
AST SERPL W P-5'-P-CCNC: 22 U/L (ref 9–39)
BASOPHILS # BLD AUTO: 0.05 X10*3/UL (ref 0–0.1)
BASOPHILS NFR BLD AUTO: 0.5 %
BILIRUB SERPL-MCNC: 0.2 MG/DL (ref 0–1.2)
BUN SERPL-MCNC: 15 MG/DL (ref 6–23)
CALCIUM SERPL-MCNC: 9.2 MG/DL (ref 8.6–10.3)
CARDIAC TROPONIN I PNL SERPL HS: <3 NG/L (ref 0–13)
CHLORIDE SERPL-SCNC: 102 MMOL/L (ref 98–107)
CO2 SERPL-SCNC: 30 MMOL/L (ref 21–32)
CREAT SERPL-MCNC: 0.76 MG/DL (ref 0.5–1.05)
EGFRCR SERPLBLD CKD-EPI 2021: >90 ML/MIN/1.73M*2
EOSINOPHIL # BLD AUTO: 0.06 X10*3/UL (ref 0–0.7)
EOSINOPHIL NFR BLD AUTO: 0.6 %
ERYTHROCYTE [DISTWIDTH] IN BLOOD BY AUTOMATED COUNT: 12.4 % (ref 11.5–14.5)
FLUAV RNA RESP QL NAA+PROBE: NOT DETECTED
FLUBV RNA RESP QL NAA+PROBE: NOT DETECTED
GLUCOSE SERPL-MCNC: 96 MG/DL (ref 74–99)
HCT VFR BLD AUTO: 39.6 % (ref 36–46)
HGB BLD-MCNC: 12.9 G/DL (ref 12–16)
IMM GRANULOCYTES # BLD AUTO: 0.01 X10*3/UL (ref 0–0.7)
IMM GRANULOCYTES NFR BLD AUTO: 0.1 % (ref 0–0.9)
LYMPHOCYTES # BLD AUTO: 3.42 X10*3/UL (ref 1.2–4.8)
LYMPHOCYTES NFR BLD AUTO: 33.1 %
MCH RBC QN AUTO: 29.9 PG (ref 26–34)
MCHC RBC AUTO-ENTMCNC: 32.6 G/DL (ref 32–36)
MCV RBC AUTO: 92 FL (ref 80–100)
MONOCYTES # BLD AUTO: 0.75 X10*3/UL (ref 0.1–1)
MONOCYTES NFR BLD AUTO: 7.3 %
MONONUCLEOSIS SCREEN: NEGATIVE
NEUTROPHILS # BLD AUTO: 6.03 X10*3/UL (ref 1.2–7.7)
NEUTROPHILS NFR BLD AUTO: 58.4 %
NRBC BLD-RTO: 0 /100 WBCS (ref 0–0)
PLATELET # BLD AUTO: 246 X10*3/UL (ref 150–450)
POTASSIUM SERPL-SCNC: 3.8 MMOL/L (ref 3.5–5.3)
PROT SERPL-MCNC: 7.5 G/DL (ref 6.4–8.2)
RBC # BLD AUTO: 4.32 X10*6/UL (ref 4–5.2)
SARS-COV-2 RNA RESP QL NAA+PROBE: NOT DETECTED
SODIUM SERPL-SCNC: 138 MMOL/L (ref 136–145)
WBC # BLD AUTO: 10.3 X10*3/UL (ref 4.4–11.3)

## 2024-11-10 PROCEDURE — 99284 EMERGENCY DEPT VISIT MOD MDM: CPT | Mod: 25

## 2024-11-10 PROCEDURE — 93005 ELECTROCARDIOGRAM TRACING: CPT

## 2024-11-10 PROCEDURE — 84075 ASSAY ALKALINE PHOSPHATASE: CPT

## 2024-11-10 PROCEDURE — 85025 COMPLETE CBC W/AUTO DIFF WBC: CPT

## 2024-11-10 PROCEDURE — 71045 X-RAY EXAM CHEST 1 VIEW: CPT | Mod: FOREIGN READ | Performed by: RADIOLOGY

## 2024-11-10 PROCEDURE — 84484 ASSAY OF TROPONIN QUANT: CPT

## 2024-11-10 PROCEDURE — 87636 SARSCOV2 & INF A&B AMP PRB: CPT

## 2024-11-10 PROCEDURE — 2500000004 HC RX 250 GENERAL PHARMACY W/ HCPCS (ALT 636 FOR OP/ED)

## 2024-11-10 PROCEDURE — 96374 THER/PROPH/DIAG INJ IV PUSH: CPT

## 2024-11-10 PROCEDURE — 2500000001 HC RX 250 WO HCPCS SELF ADMINISTERED DRUGS (ALT 637 FOR MEDICARE OP)

## 2024-11-10 PROCEDURE — 86308 HETEROPHILE ANTIBODY SCREEN: CPT

## 2024-11-10 PROCEDURE — 99285 EMERGENCY DEPT VISIT HI MDM: CPT | Performed by: STUDENT IN AN ORGANIZED HEALTH CARE EDUCATION/TRAINING PROGRAM

## 2024-11-10 PROCEDURE — 36415 COLL VENOUS BLD VENIPUNCTURE: CPT

## 2024-11-10 PROCEDURE — 71045 X-RAY EXAM CHEST 1 VIEW: CPT

## 2024-11-10 PROCEDURE — 2500000002 HC RX 250 W HCPCS SELF ADMINISTERED DRUGS (ALT 637 FOR MEDICARE OP, ALT 636 FOR OP/ED)

## 2024-11-10 RX ORDER — KETOROLAC TROMETHAMINE 15 MG/ML
15 INJECTION, SOLUTION INTRAMUSCULAR; INTRAVENOUS ONCE
Status: COMPLETED | OUTPATIENT
Start: 2024-11-10 | End: 2024-11-10

## 2024-11-10 RX ORDER — PROMETHAZINE HYDROCHLORIDE 25 MG/1
12.5 TABLET ORAL ONCE
Status: COMPLETED | OUTPATIENT
Start: 2024-11-10 | End: 2024-11-10

## 2024-11-10 RX ORDER — DOXYCYCLINE 100 MG/1
100 CAPSULE ORAL ONCE
Status: COMPLETED | OUTPATIENT
Start: 2024-11-10 | End: 2024-11-10

## 2024-11-10 RX ORDER — DOXYCYCLINE HYCLATE 100 MG
100 TABLET ORAL EVERY 12 HOURS
Qty: 10 TABLET | Refills: 0 | Status: SHIPPED | OUTPATIENT
Start: 2024-11-10 | End: 2024-11-15

## 2024-11-10 ASSESSMENT — PAIN SCALES - GENERAL
PAINLEVEL_OUTOF10: 2
PAINLEVEL_OUTOF10: 2
PAINLEVEL_OUTOF10: 0 - NO PAIN

## 2024-11-10 ASSESSMENT — PAIN DESCRIPTION - DESCRIPTORS: DESCRIPTORS: PRESSURE

## 2024-11-10 ASSESSMENT — COLUMBIA-SUICIDE SEVERITY RATING SCALE - C-SSRS
1. IN THE PAST MONTH, HAVE YOU WISHED YOU WERE DEAD OR WISHED YOU COULD GO TO SLEEP AND NOT WAKE UP?: NO
2. HAVE YOU ACTUALLY HAD ANY THOUGHTS OF KILLING YOURSELF?: NO
6. HAVE YOU EVER DONE ANYTHING, STARTED TO DO ANYTHING, OR PREPARED TO DO ANYTHING TO END YOUR LIFE?: NO

## 2024-11-10 ASSESSMENT — PAIN - FUNCTIONAL ASSESSMENT: PAIN_FUNCTIONAL_ASSESSMENT: 0-10

## 2024-11-10 NOTE — DISCHARGE INSTRUCTIONS
Prescription for doxycycline to treat any infection related to your symptoms today.  Please take this as prescribed to completion.    Please follow-up with your primary care provider in the next few days for continued management.  Please manage symptoms with Tylenol/ibuprofen as needed for fever, ensuring good fluid intake, rest, and humidifier use.  Return to the emergency department or seek immediate medical care if there are any new or worsening signs of shortness of breath, difficulty breathing, wheezing, concern for dehydration or any new or worsening symptoms.

## 2024-11-10 NOTE — ED PROVIDER NOTES
EMERGENCY DEPARTMENT ENCOUNTER      Pt Name: Charlotte Crain  MRN: [id number]  Birthdate 1990  Date of evaluation: 11/10/2024  Provider: Jose Cruz Tapia DO    CHIEF COMPLAINT       Chief Complaint   Patient presents with    Cough    Dizziness    Flu Symptoms    Nausea         HISTORY OF PRESENT ILLNESS    Patient is a 34-year-old female presenting today with a chief complaint of cough and upper respiratory symptoms as well as generalized fatigue, nausea, vomiting as well as intermittent diarrhea for the past month or so.  She states she is felt to ever since return from vacation.  Also describes a sensation of pressure in the center of her chest.  This is rated a 2 out of 10 in severity.  Denies any other shortness of breath.  Patient somewhat nauseous right now.  Describes intermittent chills at home as well.  No dysuria.          Nursing Notes were reviewed.    PAST MEDICAL HISTORY     Past Medical History:   Diagnosis Date    Acute frontal sinusitis, unspecified 06/03/2013    Acute frontal sinusitis    Acute pharyngitis, unspecified 06/11/2013    Sore throat    Allergic     Anxiety     Encounter for insertion of intrauterine contraceptive device     Encounter for insertion of mirena IUD    Encounter for other preprocedural examination 01/29/2015    Preop testing    Endometriosis, unspecified 03/01/2022    Endometriosis    Irregular menstruation, unspecified 11/16/2012    Irregular menstrual cycle    Other conditions influencing health status     Pott's Disease    Pain, unspecified 04/22/2013    Acute pain    Personal history of diseases of the blood and blood-forming organs and certain disorders involving the immune mechanism 03/25/2014    History of thrombocytopenia    Personal history of diseases of the skin and subcutaneous tissue 11/16/2012    History of folliculitis    Personal history of other diseases of the nervous system and sense organs 10/08/2015    History of migraine    Personal history of other  diseases of the respiratory system 01/17/2014    History of upper respiratory infection    Personal history of other diseases of the respiratory system 08/23/2022    History of sore throat    Personal history of other diseases of the respiratory system     History of sore throat    Personal history of other infectious and parasitic diseases     History of infectious mononucleosis    Personal history of other specified conditions 09/09/2016    History of headache         SURGICAL HISTORY       Past Surgical History:   Procedure Laterality Date    ANKLE SURGERY  11/16/2012    Ankle Surgery    ELBOW SURGERY  11/16/2012    Elbow Surgery    LAPAROSCOPY DIAGNOSTIC / BIOPSY / ASPIRATION / LYSIS  03/01/2016    Laparoscopy (Diagnostic)    TONSILLECTOMY  11/16/2012    Tonsillectomy         CURRENT MEDICATIONS       Current Discharge Medication List        CONTINUE these medications which have NOT CHANGED    Details   albuterol 90 mcg/actuation inhaler Inhale 2 puffs 4 times a day. PRN      amitriptyline (Elavil) 10 mg tablet TAKE 3 TABLETS BY MOUTH AT NIGHT  Qty: 270 tablet, Refills: 1    Associated Diagnoses: Migraine, unspecified, not intractable, without status migrainosus      fludrocortisone (Florinef) 0.1 mg tablet Take 1 tablet (0.1 mg) by mouth once daily.  Qty: 90 tablet, Refills: 3    Associated Diagnoses: Orthostatic hypotension      fluticasone (Flonase) 50 mcg/actuation nasal spray SPRAY 2 SPRAYS INTO EACH NOSTRIL EVERY DAY  Qty: 48 mL, Refills: 1    Associated Diagnoses: Unspecified acute lower respiratory infection      hydrOXYzine HCL (Atarax) 25 mg tablet Take 1 tablet (25 mg) by mouth 3 times a day as needed.      meloxicam (Mobic) 7.5 mg tablet Take 1 tablet (7.5 mg) by mouth once daily.      methocarbamol (Robaxin) 500 mg tablet Take 1 tablet (500 mg) by mouth 4 times a day as needed for muscle spasms for up to 7 days.  Qty: 28 tablet, Refills: 0    Associated Diagnoses: Muscle spasm      naproxen  (Naprosyn) 500 mg tablet Take 1 tablet (500 mg) by mouth if needed.      norethindrone (Aygestin) 5 mg tablet TAKE 1 TABLET DAILY  Qty: 30 tablet, Refills: 1    Associated Diagnoses: Endometriosis      pyridostigmine (Mestinon) 60 mg tablet TAKE 0.5 TABLET TWICE DAILY IF NEEDED  Qty: 90 tablet, Refills: 3    Associated Diagnoses: Other specified cardiac arrhythmias      sertraline (Zoloft) 100 mg tablet TAKE 1 TABLET BY MOUTH EVERY DAY  Qty: 90 tablet, Refills: 0    Associated Diagnoses: Generalized anxiety disorder             ALLERGIES     Ondansetron hcl and Amoxicillin    FAMILY HISTORY     No family history on file.       SOCIAL HISTORY       Social History     Socioeconomic History    Marital status: Single   Tobacco Use    Smoking status: Never    Smokeless tobacco: Never   Substance and Sexual Activity    Alcohol use: Yes     Alcohol/week: 1.0 standard drink of alcohol     Types: 1 Glasses of wine per week    Drug use: Never    Sexual activity: Not Currently     Partners: Male     Birth control/protection: Condom Male, Injection, I.U.D.     Social Drivers of Health      [facility], [facility]    [place] [employer] [facility]       SCREENINGS                        PHYSICAL EXAM    (up to 7 for level 4, 8 or more for level 5)     ED Triage Vitals   Temperature Heart Rate Respirations BP   11/10/24 0245 11/10/24 0245 11/10/24 0302 11/10/24 0245   36.4 °C (97.5 °F) 89 18 153/71      Pulse Ox Temp Source Heart Rate Source Patient Position   11/10/24 0245 11/10/24 0245 11/10/24 0245 11/10/24 0245   100 % Temporal Monitor Sitting      BP Location FiO2 (%)     11/10/24 0245 --     Right arm        Physical Exam  Vitals and nursing note reviewed.   Constitutional:       General: She is not in acute distress.     Appearance: Normal appearance. She is not ill-appearing or toxic-appearing.   HENT:      Head: Normocephalic and atraumatic.      Right Ear: External ear normal.      Left  Ear: External ear normal.      Nose: Nose normal.   Eyes:      General:         Right eye: No discharge.         Left eye: No discharge.      Extraocular Movements: Extraocular movements intact.      Conjunctiva/sclera: Conjunctivae normal.      Pupils: Pupils are equal, round, and reactive to light.   Cardiovascular:      Rate and Rhythm: Normal rate and regular rhythm.      Pulses: Normal pulses.      Heart sounds: Normal heart sounds.   Pulmonary:      Effort: Pulmonary effort is normal. No respiratory distress.      Breath sounds: Normal breath sounds. No stridor. No wheezing, rhonchi or rales.   Abdominal:      General: There is no distension.      Palpations: Abdomen is soft.   Musculoskeletal:         General: No deformity or signs of injury.   Skin:     General: Skin is warm and dry.   Neurological:      General: No focal deficit present.      Mental Status: She is alert and oriented to person, place, and time. Mental status is at baseline.   Psychiatric:         Mood and Affect: Mood normal.         Behavior: Behavior normal.          DIAGNOSTIC RESULTS     LABS:  Labs Reviewed   COMPREHENSIVE METABOLIC PANEL - Normal       Result Value    Glucose 96      Sodium 138      Potassium 3.8      Chloride 102      Bicarbonate 30      Anion Gap 10      Urea Nitrogen 15      Creatinine 0.76      eGFR >90      Calcium 9.2      Albumin 4.3      Alkaline Phosphatase 78      Total Protein 7.5      AST 22      Bilirubin, Total 0.2      ALT 32     TROPONIN I, HIGH SENSITIVITY - Normal    Troponin I, High Sensitivity <3      Narrative:     Less than 99th percentile of normal range cutoff-  Female and children under 18 years old <14 ng/L; Male <21 ng/L: Negative  Repeat testing should be performed if clinically indicated.     Female and children under 18 years old 14-50 ng/L; Male 21-50 ng/L:  Consistent with possible cardiac damage and possible increased clinical   risk. Serial measurements may help to assess extent of  myocardial damage.     >50 ng/L: Consistent with cardiac damage, increased clinical risk and  myocardial infarction. Serial measurements may help assess extent of   myocardial damage.      NOTE: Children less than 1 year old may have higher baseline troponin   levels and results should be interpreted in conjunction with the overall   clinical context.     NOTE: Troponin I testing is performed using a different   testing methodology at [facility] than at [facility]. Direct result comparisons should only   be made within the same method.   MONONUCLEOSIS SCREEN (HETEROPHILE ANTIBODY) - Normal    Mononucleosis Screen Negative     SARS-COV-2 PCR - Normal    Coronavirus 2019, PCR Not Detected      Narrative:     This assay has received FDA Emergency Use Authorization (EUA) and is only authorized for the duration of time that circumstances exist to justify the authorization of the emergency use of in vitro diagnostic tests for the detection of SARS-CoV-2 virus and/or diagnosis of COVID-19 infection under section 564(b)(1) of the Act, 21 U.S.C. 360bbb-3(b)(1). This assay is an in vitro diagnostic nucleic acid amplification test for the qualitative detection of SARS-CoV-2 from nasopharyngeal specimens and has been validated for use at [facility]. Negative results do not preclude COVID-19 infections and should not be used as the sole basis for diagnosis, treatment, or other management decisions.     INFLUENZA A AND B PCR - Normal    Flu A Result Not Detected      Flu B Result Not Detected      Narrative:     This assay is an in vitro diagnostic multiplex nucleic acid amplification test for the detection and discrimination of Influenza A & B from nasopharyngeal specimens, and has been validated for use at [facility]. Negative results do not preclude Influenza A/B infections, and should not be used as the sole basis for diagnosis, treatment, or other  management decisions. If Influenza A/B and RSV PCR results are negative, testing for Parainfluenza virus, Adenovirus and Metapneumovirus is routinely performed for [facility] pediatric oncology and intensive care inpatients, and is available on other patients by placing an add-on request.   CBC WITH AUTO DIFFERENTIAL    WBC 10.3      nRBC 0.0      RBC 4.32      Hemoglobin 12.9      Hematocrit 39.6      MCV 92      MCH 29.9      MCHC 32.6      RDW 12.4      Platelets 246      Neutrophils % 58.4      Immature Granulocytes %, Automated 0.1      Lymphocytes % 33.1      Monocytes % 7.3      Eosinophils % 0.6      Basophils % 0.5      Neutrophils Absolute 6.03      Immature Granulocytes Absolute, Automated 0.01      Lymphocytes Absolute 3.42      Monocytes Absolute 0.75      Eosinophils Absolute 0.06      Basophils Absolute 0.05         All other labs were within normal range or not returned as of this dictation.    Imaging  XR chest 1 view   Final Result   No acute abnormality.   Signed by Jose Banks, DO           Procedures  Procedures     EMERGENCY DEPARTMENT COURSE/MDM:   Medical Decision Making  34-year-old female presented to the chief plaint of cough, sinus congestion, chills, nausea, vomiting, diarrhea for the past month or so.  Has a pressure sensation in the center of her chest as well.  She is overall well-appearing on my initial evaluation.  She has cough intermittently.  Her lungs are clear to auscultation bilaterally.  Her vital signs are stable.  Differential includes not limited to sinusitis, sinus congestion, bacterial sinus infection, walking pneumonia, pneumonia, ACS.  We will proceed with usual chest pain work-up including EKG, chest x-ray, CBC, CMP, troponin, to rule out acute coronary syndrome.  High suspicion for viral or bacterial infection as the cortical source of the patient's symptoms.  Patient given a dose of Toradol for symptomatic management.  Was given a dose of Phenergan for symptomatic  management.        Please see ED course for additional MDM.    ED Course as of 11/10/24 0406   Sun Nov 10, 2024   0329 EKG taken at 251 on 10 November 2024 showing normal sinus rate and rhythm with marked sinus arrhythmia, normal axis, normal intervals, no acute ST elevation or depression [DS]   0336 ELECTROCARDIOGRAM RHYTHM STRIP [DS]      ED Course User Index  [DS] Luc Cervantes MD         Diagnoses as of 11/10/24 0406   Sinusitis, unspecified chronicity, unspecified location        XR chest 1 view   Final Result   No acute abnormality.   Signed by Jose Banks DO          Patient and or family in agreement and understanding of treatment plan.  All questions answered.      I reviewed the case with the attending ED physician. The attending ED physician agrees with the plan. Patient and/or patient´s representative was counseled regarding labs, imaging, likely diagnosis, and plan. All questions were answered.    ED Medications administered this visit:    Medications   doxycycline (Vibramycin) capsule 100 mg (has no administration in time range)   ketorolac (Toradol) injection 15 mg (15 mg intravenous Given 11/10/24 0321)   promethazine (Phenergan) tablet 12.5 mg (12.5 mg oral Given 11/10/24 0317)       New Prescriptions from this visit:    Current Discharge Medication List        START taking these medications    Details   doxycycline (Vibra-Tabs) 100 mg tablet Take 1 tablet (100 mg) by mouth every 12 hours for 5 days. Take with a full glass of water and do not lie down for at least 30 minutes after.  Qty: 10 tablet, Refills: 0    Comments: May replace with monohydrate if less expensive or not available  Associated Diagnoses: Sinusitis, unspecified chronicity, unspecified location             Follow-up:  No follow-up provider specified.      Final Impression:   1. Sinusitis, unspecified chronicity, unspecified location          (Please note that portions of this note were completed with a voice recognition  program.  Efforts were made to edit the dictations but occasionally words are mis-transcribed.)     Jose Cruz Tapia DO  Resident  11/10/24 2896

## 2024-11-10 NOTE — ED TRIAGE NOTES
"Patient seen in ED for dry, nonproductive cough, lethargy, \"feeling run down\", fevers. Patient took tylenol at 0000 11/10/24. Patient also has complaints of nausea and dizziness, slight chest pressure.   "

## 2024-11-11 DIAGNOSIS — R06.02 SHORTNESS OF BREATH: Primary | ICD-10-CM

## 2024-11-11 RX ORDER — PREDNISONE 20 MG/1
40 TABLET ORAL DAILY
Qty: 10 TABLET | Refills: 0 | Status: SHIPPED | OUTPATIENT
Start: 2024-11-11 | End: 2024-11-16

## 2024-11-14 LAB
ATRIAL RATE: 75 BPM
P AXIS: 46 DEGREES
P OFFSET: 187 MS
P ONSET: 134 MS
PR INTERVAL: 166 MS
Q ONSET: 217 MS
QRS COUNT: 12 BEATS
QRS DURATION: 84 MS
QT INTERVAL: 360 MS
QTC CALCULATION(BAZETT): 402 MS
QTC FREDERICIA: 387 MS
R AXIS: 54 DEGREES
T AXIS: 44 DEGREES
T OFFSET: 397 MS
VENTRICULAR RATE: 75 BPM

## 2024-12-06 DIAGNOSIS — I95.1 ORTHOSTATIC HYPOTENSION: ICD-10-CM

## 2024-12-06 RX ORDER — FLUDROCORTISONE ACETATE 0.1 MG/1
0.1 TABLET ORAL DAILY
Qty: 90 TABLET | Refills: 0 | Status: SHIPPED | OUTPATIENT
Start: 2024-12-06 | End: 2025-12-06

## 2024-12-22 DIAGNOSIS — F41.1 GENERALIZED ANXIETY DISORDER: ICD-10-CM

## 2024-12-23 RX ORDER — SERTRALINE HYDROCHLORIDE 100 MG/1
100 TABLET, FILM COATED ORAL DAILY
Qty: 90 TABLET | Refills: 0 | Status: SHIPPED | OUTPATIENT
Start: 2024-12-23

## 2025-01-08 DIAGNOSIS — J06.9 VIRAL URI WITH COUGH: Primary | ICD-10-CM

## 2025-01-08 RX ORDER — PREDNISONE 20 MG/1
40 TABLET ORAL DAILY
Qty: 10 TABLET | Refills: 0 | Status: SHIPPED | OUTPATIENT
Start: 2025-01-08 | End: 2025-01-13

## 2025-01-21 ENCOUNTER — APPOINTMENT (OUTPATIENT)
Dept: NEUROLOGY | Facility: CLINIC | Age: 35
End: 2025-01-21
Payer: COMMERCIAL

## 2025-01-21 VITALS
HEART RATE: 121 BPM | BODY MASS INDEX: 41.02 KG/M2 | WEIGHT: 293 LBS | HEIGHT: 71 IN | TEMPERATURE: 97 F | DIASTOLIC BLOOD PRESSURE: 78 MMHG | SYSTOLIC BLOOD PRESSURE: 116 MMHG

## 2025-01-21 DIAGNOSIS — G43.809 OTHER MIGRAINE WITHOUT STATUS MIGRAINOSUS, NOT INTRACTABLE: Primary | ICD-10-CM

## 2025-01-21 DIAGNOSIS — G90.A POTS (POSTURAL ORTHOSTATIC TACHYCARDIA SYNDROME): ICD-10-CM

## 2025-01-21 DIAGNOSIS — I49.8 OTHER SPECIFIED CARDIAC ARRHYTHMIAS: ICD-10-CM

## 2025-01-21 PROCEDURE — 99204 OFFICE O/P NEW MOD 45 MIN: CPT | Performed by: PSYCHIATRY & NEUROLOGY

## 2025-01-21 PROCEDURE — 3074F SYST BP LT 130 MM HG: CPT | Performed by: PSYCHIATRY & NEUROLOGY

## 2025-01-21 PROCEDURE — 3078F DIAST BP <80 MM HG: CPT | Performed by: PSYCHIATRY & NEUROLOGY

## 2025-01-21 PROCEDURE — 1036F TOBACCO NON-USER: CPT | Performed by: PSYCHIATRY & NEUROLOGY

## 2025-01-21 PROCEDURE — 3008F BODY MASS INDEX DOCD: CPT | Performed by: PSYCHIATRY & NEUROLOGY

## 2025-01-21 RX ORDER — TRAMADOL HYDROCHLORIDE 50 MG/1
TABLET ORAL
COMMUNITY
Start: 2024-04-28

## 2025-01-21 RX ORDER — SUMATRIPTAN SUCCINATE 100 MG/1
100 TABLET ORAL ONCE AS NEEDED
Qty: 9 TABLET | Refills: 5 | Status: SHIPPED | OUTPATIENT
Start: 2025-01-21

## 2025-01-21 RX ORDER — PROMETHAZINE HYDROCHLORIDE 25 MG/1
TABLET ORAL
COMMUNITY
Start: 2024-04-28

## 2025-01-21 ASSESSMENT — LIFESTYLE VARIABLES
SKIP TO QUESTIONS 9-10: 1
AUDIT-C TOTAL SCORE: 3
HOW OFTEN DO YOU HAVE A DRINK CONTAINING ALCOHOL: 2-3 TIMES A WEEK
HOW MANY STANDARD DRINKS CONTAINING ALCOHOL DO YOU HAVE ON A TYPICAL DAY: 1 OR 2
HOW OFTEN DO YOU HAVE SIX OR MORE DRINKS ON ONE OCCASION: NEVER

## 2025-01-21 ASSESSMENT — PATIENT HEALTH QUESTIONNAIRE - PHQ9
SUM OF ALL RESPONSES TO PHQ9 QUESTIONS 1 & 2: 0
2. FEELING DOWN, DEPRESSED OR HOPELESS: NOT AT ALL
1. LITTLE INTEREST OR PLEASURE IN DOING THINGS: NOT AT ALL

## 2025-01-21 NOTE — PROGRESS NOTES
Consulting Physician: None    Chief Complaint: POTs, Headaches    History Of Present Illness  Charlotte Crain is a 34 y.o. female presenting with POTs and headaches.    The patient states that in 2008, she was diagnosed with POTs.  She would recurrent syncopal episodes.  Her mother states that she would pass out while running.  She is currently on Mestinon and Florinef.  She continues to have syncopal episodes (she may have syncopal episodes twice per year).  She is usually out for a few seconds.  She gets lightheaded quite frequently (occurring a couple of times per week).  It usually occurs when she goes from a sitting to standing position.  She denies any difficulty with sweating, constipation.  She also denies any numbness and tingling in her hands and feet.  She was told that she has neuropathy in her feet.    She is currently taking Mestinon 30 mg daily.  She is currently taking Fludrocortisone 0.1 mg/day.      She's had migraines since 2008.  Her migraines are described a bifrontal/bitemporal throbbing/pressure pain with associated photophobia, phonophobia, nausea, and vomiting.  She takes over the counter medications which does not help.  Her migraines last 24-48 hours.  Her migraines occur twice per month.  She has no family history of migraines.      The patient denies any double vision, loss of peripheral vision, slurred speech, difficulty getting the words out, facial droop, facial numbness, focal weakness, focal numbness, loss of coordination, or loss of balance.         Past Medical History  Past Medical History:   Diagnosis Date    Acute frontal sinusitis, unspecified 06/03/2013    Acute frontal sinusitis    Acute pharyngitis, unspecified 06/11/2013    Sore throat    Allergic     Anxiety     Encounter for insertion of intrauterine contraceptive device     Encounter for insertion of mirena IUD    Encounter for other preprocedural examination 01/29/2015    Preop testing    Endometriosis, unspecified  03/01/2022    Endometriosis    Irregular menstruation, unspecified 11/16/2012    Irregular menstrual cycle    Other conditions influencing health status     Pott's Disease    Pain, unspecified 04/22/2013    Acute pain    Personal history of diseases of the blood and blood-forming organs and certain disorders involving the immune mechanism 03/25/2014    History of thrombocytopenia    Personal history of diseases of the skin and subcutaneous tissue 11/16/2012    History of folliculitis    Personal history of other diseases of the nervous system and sense organs 10/08/2015    History of migraine    Personal history of other diseases of the respiratory system 01/17/2014    History of upper respiratory infection    Personal history of other diseases of the respiratory system 08/23/2022    History of sore throat    Personal history of other diseases of the respiratory system     History of sore throat    Personal history of other infectious and parasitic diseases     History of infectious mononucleosis    Personal history of other specified conditions 09/09/2016    History of headache       Surgical History  Past Surgical History:   Procedure Laterality Date    ANKLE SURGERY  11/16/2012    Ankle Surgery    ELBOW SURGERY  11/16/2012    Elbow Surgery    LAPAROSCOPY DIAGNOSTIC / BIOPSY / ASPIRATION / LYSIS  03/01/2016    Laparoscopy (Diagnostic)    TONSILLECTOMY  11/16/2012    Tonsillectomy       Family History  No family history on file.     Social History   reports that she has never smoked. She has never used smokeless tobacco. She reports current alcohol use of about 1.0 standard drink of alcohol per week. She reports that she does not use drugs.     Allergies  Ondansetron hcl and Amoxicillin    Medications    Current Outpatient Medications:     albuterol 90 mcg/actuation inhaler, Inhale 2 puffs 4 times a day. PRN, Disp: , Rfl:     amitriptyline (Elavil) 10 mg tablet, TAKE 3 TABLETS BY MOUTH AT NIGHT, Disp: 270 tablet,  "Rfl: 1    fludrocortisone (Florinef) 0.1 mg tablet, TAKE 1 TABLET (0.1 MG) BY MOUTH ONCE DAILY., Disp: 90 tablet, Rfl: 0    fluticasone (Flonase) 50 mcg/actuation nasal spray, SPRAY 2 SPRAYS INTO EACH NOSTRIL EVERY DAY, Disp: 48 mL, Rfl: 1    meloxicam (Mobic) 7.5 mg tablet, Take 1 tablet (7.5 mg) by mouth once daily., Disp: , Rfl:     naproxen (Naprosyn) 500 mg tablet, Take 1 tablet (500 mg) by mouth if needed., Disp: , Rfl:     promethazine (Phenergan) 25 mg tablet, TAKE 1 TABLET BY MOUTH EVERY 6 TO 8 HOURS AS NEEDED FOR NAUSEA FOR 15 DAYS, Disp: , Rfl:     pyridostigmine (Mestinon) 60 mg tablet, TAKE 0.5 TABLET TWICE DAILY IF NEEDED, Disp: 90 tablet, Rfl: 3    sertraline (Zoloft) 100 mg tablet, TAKE 1 TABLET BY MOUTH EVERY DAY, Disp: 90 tablet, Rfl: 0    traMADol (Ultram) 50 mg tablet, TAKE 1 TABLET BY MOUTH EVERY 8 HOURS FOR 3 DAYSFOR SURGICAL PAIN, Disp: , Rfl:     methocarbamol (Robaxin) 500 mg tablet, Take 1 tablet (500 mg) by mouth 4 times a day as needed for muscle spasms for up to 7 days., Disp: 28 tablet, Rfl: 0    norethindrone (Aygestin) 5 mg tablet, TAKE 1 TABLET DAILY, Disp: 30 tablet, Rfl: 1      Last Recorded Vitals   Blood pressure 116/78, pulse (!) 121, temperature 36.1 °C (97 °F), temperature source Temporal, height 1.803 m (5' 11\"), weight (!) 150 kg (331 lb 3.2 oz).    Objective:  Gen: NAD  Neuro:  --HIF: A&O X 3, repetition and naming intact  --CN:  PERRLA, EOMI, VFF, no visible facial asymmetry, facial sensation intact, no tongue or palatal deviation, SCM intact  --Motor: Moves all 4 extremities equally; no focal deficits  --Sensory: Intact to light touch, intact to pinprick  --Reflex: 2+ symmetric, toes down  --Cerebellum: FTN and HTS intact  --Gait: Normal, narrow based.  Toe and Heal Walking Intact.  Tandem Intact    Relevant Results  Lab Results   Component Value Date    WBC 10.3 11/10/2024    HGB 12.9 11/10/2024    HCT 39.6 11/10/2024    MCV 92 11/10/2024     11/10/2024       Lab " "Results   Component Value Date    GLUCOSE 96 11/10/2024    CALCIUM 9.2 11/10/2024     11/10/2024    K 3.8 11/10/2024    CO2 30 11/10/2024     11/10/2024    BUN 15 11/10/2024    CREATININE 0.76 11/10/2024       Lab Results   Component Value Date    HGBA1C 5.0 05/26/2023       Lab Results   Component Value Date    CHOL 174 08/04/2023    CHOL 176 06/10/2021     Lab Results   Component Value Date    HDL 57.8 08/04/2023    HDL 63.0 06/10/2021     No results found for: \"LDLCALC\"  Lab Results   Component Value Date    TRIG 107 08/04/2023    TRIG 140 06/10/2021     No components found for: \"CHOLHDL\"    Autonomic Testing:  Consistent with POTs     Assessment:   POTs - patient was diagnosed with POTs in 2008; she is currently on Fludrocortisone 0.1 mg/day and Mestinon 30 mg/day; averages 2 syncopal episodes/year  - recommend increasing Mestinon to 30 mg BID  - recommend continue Fludrocortisone  - encourage hydration and increase salt intake  - wear compression socks    2.  Migraine - currently her migraines are twice per month  - she has previously been on TPM  - currently, she is on Amitriptyline 10 mg at bedtime  - recommend continuing Amitriptyline 10 mg at bedtime  - recommend starting Sumatriptan 100 mg PO q2 hours prn migraine as abortive therapy    Follow up in 6 months,        Rodriguez Hinson MD  [facility]  Department of Neurology      A copy of this note was sent to the referring provider.    "

## 2025-01-23 ENCOUNTER — TELEPHONE (OUTPATIENT)
Dept: PRIMARY CARE | Facility: CLINIC | Age: 35
End: 2025-01-23
Payer: COMMERCIAL

## 2025-01-23 NOTE — TELEPHONE ENCOUNTER
Pt called regarding a medication change for Zoloft 100mg. Pt is not having side effects but she was wanting to increase the medication. Pt feels that it is not working- not sure if she needs to change to alternate medication or up the dose. Pt also stated the last few days have been really rough mentally    Pt # [phone]

## 2025-01-24 ENCOUNTER — TELEPHONE (OUTPATIENT)
Dept: PRIMARY CARE | Facility: CLINIC | Age: 35
End: 2025-01-24
Payer: COMMERCIAL

## 2025-01-24 DIAGNOSIS — J06.9 URTI (ACUTE UPPER RESPIRATORY INFECTION): Primary | ICD-10-CM

## 2025-01-24 RX ORDER — DOXYCYCLINE 100 MG/1
100 CAPSULE ORAL 2 TIMES DAILY
Qty: 20 CAPSULE | Refills: 0 | Status: SHIPPED | OUTPATIENT
Start: 2025-01-24 | End: 2025-02-03

## 2025-01-24 NOTE — TELEPHONE ENCOUNTER
Pt called- has been sick for sinus infection for about since [place]- pt has been on multiple rounds of antibiotics and is still having symptoms. Pt is having congestion and blockage of L ear.

## 2025-02-02 ENCOUNTER — PATIENT MESSAGE (OUTPATIENT)
Dept: PRIMARY CARE | Facility: CLINIC | Age: 35
End: 2025-02-02
Payer: COMMERCIAL

## 2025-02-02 DIAGNOSIS — Z00.00 ANNUAL PHYSICAL EXAM: Primary | ICD-10-CM

## 2025-02-09 LAB
ALBUMIN SERPL-MCNC: 4.3 G/DL (ref 3.6–5.1)
ALP SERPL-CCNC: 90 U/L (ref 31–125)
ALT SERPL-CCNC: 36 U/L (ref 6–29)
ANION GAP SERPL CALCULATED.4IONS-SCNC: 11 MMOL/L (CALC) (ref 7–17)
AST SERPL-CCNC: 25 U/L (ref 10–30)
BASOPHILS # BLD AUTO: 71 CELLS/UL (ref 0–200)
BASOPHILS NFR BLD AUTO: 0.8 %
BILIRUB SERPL-MCNC: 0.5 MG/DL (ref 0.2–1.2)
BUN SERPL-MCNC: 10 MG/DL (ref 7–25)
CALCIUM SERPL-MCNC: 9.1 MG/DL (ref 8.6–10.2)
CHLORIDE SERPL-SCNC: 102 MMOL/L (ref 98–110)
CHOLEST SERPL-MCNC: 187 MG/DL
CHOLEST/HDLC SERPL: 3.2 (CALC)
CO2 SERPL-SCNC: 25 MMOL/L (ref 20–32)
CREAT SERPL-MCNC: 0.62 MG/DL (ref 0.5–0.97)
EGFRCR SERPLBLD CKD-EPI 2021: 121 ML/MIN/1.73M2
EOSINOPHIL # BLD AUTO: 89 CELLS/UL (ref 15–500)
EOSINOPHIL NFR BLD AUTO: 1 %
ERYTHROCYTE [DISTWIDTH] IN BLOOD BY AUTOMATED COUNT: 12.7 % (ref 11–15)
EST. AVERAGE GLUCOSE BLD GHB EST-MCNC: 123 MG/DL
EST. AVERAGE GLUCOSE BLD GHB EST-SCNC: 6.8 MMOL/L
GLUCOSE SERPL-MCNC: 100 MG/DL (ref 65–99)
HBA1C MFR BLD: 5.9 % OF TOTAL HGB
HCT VFR BLD AUTO: 44.8 % (ref 35–45)
HDLC SERPL-MCNC: 59 MG/DL
HGB BLD-MCNC: 14.6 G/DL (ref 11.7–15.5)
LDLC SERPL CALC-MCNC: 109 MG/DL (CALC)
LYMPHOCYTES # BLD AUTO: 3694 CELLS/UL (ref 850–3900)
LYMPHOCYTES NFR BLD AUTO: 41.5 %
MCH RBC QN AUTO: 29.8 PG (ref 27–33)
MCHC RBC AUTO-ENTMCNC: 32.6 G/DL (ref 32–36)
MCV RBC AUTO: 91.4 FL (ref 80–100)
MONOCYTES # BLD AUTO: 641 CELLS/UL (ref 200–950)
MONOCYTES NFR BLD AUTO: 7.2 %
NEUTROPHILS # BLD AUTO: 4406 CELLS/UL (ref 1500–7800)
NEUTROPHILS NFR BLD AUTO: 49.5 %
NONHDLC SERPL-MCNC: 128 MG/DL (CALC)
PLATELET # BLD AUTO: 309 THOUSAND/UL (ref 140–400)
PMV BLD REES-ECKER: 10.1 FL (ref 7.5–12.5)
POTASSIUM SERPL-SCNC: 4.2 MMOL/L (ref 3.5–5.3)
PROT SERPL-MCNC: 7.2 G/DL (ref 6.1–8.1)
RBC # BLD AUTO: 4.9 MILLION/UL (ref 3.8–5.1)
SODIUM SERPL-SCNC: 138 MMOL/L (ref 135–146)
TRIGL SERPL-MCNC: 97 MG/DL
TSH SERPL-ACNC: 1.71 MIU/L
WBC # BLD AUTO: 8.9 THOUSAND/UL (ref 3.8–10.8)

## 2025-02-11 ENCOUNTER — APPOINTMENT (OUTPATIENT)
Dept: PRIMARY CARE | Facility: CLINIC | Age: 35
End: 2025-02-11
Payer: COMMERCIAL

## 2025-02-11 VITALS
SYSTOLIC BLOOD PRESSURE: 130 MMHG | WEIGHT: 293 LBS | TEMPERATURE: 97.6 F | HEART RATE: 70 BPM | BODY MASS INDEX: 41.02 KG/M2 | HEIGHT: 71 IN | DIASTOLIC BLOOD PRESSURE: 82 MMHG | OXYGEN SATURATION: 98 %

## 2025-02-11 DIAGNOSIS — G90.A POSTURAL ORTHOSTATIC TACHYCARDIA SYNDROME: ICD-10-CM

## 2025-02-11 DIAGNOSIS — R73.09 ELEVATED HEMOGLOBIN A1C: ICD-10-CM

## 2025-02-11 DIAGNOSIS — E66.01 OBESITY, CLASS III, BMI 40-49.9 (MORBID OBESITY) (MULTI): ICD-10-CM

## 2025-02-11 DIAGNOSIS — R42 POSTURAL DIZZINESS: ICD-10-CM

## 2025-02-11 DIAGNOSIS — R55 VASOVAGAL EPISODE: ICD-10-CM

## 2025-02-11 DIAGNOSIS — F41.1 GENERALIZED ANXIETY DISORDER: Primary | ICD-10-CM

## 2025-02-11 DIAGNOSIS — E66.01 CLASS 3 SEVERE OBESITY WITHOUT SERIOUS COMORBIDITY WITH BODY MASS INDEX (BMI) OF 40.0 TO 44.9 IN ADULT, UNSPECIFIED OBESITY TYPE: ICD-10-CM

## 2025-02-11 DIAGNOSIS — E66.813 CLASS 3 SEVERE OBESITY WITHOUT SERIOUS COMORBIDITY WITH BODY MASS INDEX (BMI) OF 40.0 TO 44.9 IN ADULT, UNSPECIFIED OBESITY TYPE: ICD-10-CM

## 2025-02-11 PROBLEM — D69.3 IDIOPATHIC THROMBOCYTOPENIC PURPURA (MULTI): Status: RESOLVED | Noted: 2023-08-08 | Resolved: 2025-02-11

## 2025-02-11 PROCEDURE — 1036F TOBACCO NON-USER: CPT | Performed by: INTERNAL MEDICINE

## 2025-02-11 PROCEDURE — 3078F DIAST BP <80 MM HG: CPT | Performed by: INTERNAL MEDICINE

## 2025-02-11 PROCEDURE — 3074F SYST BP LT 130 MM HG: CPT | Performed by: INTERNAL MEDICINE

## 2025-02-11 PROCEDURE — 3008F BODY MASS INDEX DOCD: CPT | Performed by: INTERNAL MEDICINE

## 2025-02-11 PROCEDURE — 99215 OFFICE O/P EST HI 40 MIN: CPT | Performed by: INTERNAL MEDICINE

## 2025-02-11 RX ORDER — SERTRALINE HYDROCHLORIDE 50 MG/1
50 TABLET, FILM COATED ORAL DAILY
Qty: 90 TABLET | Refills: 3 | Status: SHIPPED | OUTPATIENT
Start: 2025-02-11 | End: 2026-02-11

## 2025-02-11 RX ORDER — HYDROXYZINE HYDROCHLORIDE 25 MG/1
TABLET, FILM COATED ORAL
Qty: 180 TABLET | Refills: 3 | Status: SHIPPED | OUTPATIENT
Start: 2025-02-11

## 2025-02-11 ASSESSMENT — PROMIS GLOBAL HEALTH SCALE
CARRYOUT_PHYSICAL_ACTIVITIES: COMPLETELY
RATE_MENTAL_HEALTH: FAIR
RATE_PHYSICAL_HEALTH: GOOD
RATE_GENERAL_HEALTH: GOOD
RATE_AVERAGE_FATIGUE: MILD
RATE_AVERAGE_PAIN: 0
RATE_QUALITY_OF_LIFE: GOOD
EMOTIONAL_PROBLEMS: SOMETIMES
RATE_SOCIAL_SATISFACTION: EXCELLENT
CARRYOUT_SOCIAL_ACTIVITIES: VERY GOOD

## 2025-02-11 ASSESSMENT — ANXIETY QUESTIONNAIRES
5. BEING SO RESTLESS THAT IT IS HARD TO SIT STILL: NOT AT ALL
4. TROUBLE RELAXING: NOT AT ALL
6. BECOMING EASILY ANNOYED OR IRRITABLE: SEVERAL DAYS
3. WORRYING TOO MUCH ABOUT DIFFERENT THINGS: SEVERAL DAYS
GAD7 TOTAL SCORE: 4
IF YOU CHECKED OFF ANY PROBLEMS ON THIS QUESTIONNAIRE, HOW DIFFICULT HAVE THESE PROBLEMS MADE IT FOR YOU TO DO YOUR WORK, TAKE CARE OF THINGS AT HOME, OR GET ALONG WITH OTHER PEOPLE: NOT DIFFICULT AT ALL
1. FEELING NERVOUS, ANXIOUS, OR ON EDGE: NOT AT ALL
2. NOT BEING ABLE TO STOP OR CONTROL WORRYING: SEVERAL DAYS
7. FEELING AFRAID AS IF SOMETHING AWFUL MIGHT HAPPEN: SEVERAL DAYS

## 2025-02-11 NOTE — ASSESSMENT & PLAN NOTE
Patient has tried several diet and exercise unable to lose weight will refer to [facility] pharmacist for weight management.

## 2025-02-11 NOTE — ASSESSMENT & PLAN NOTE
Will increase sertraline up to 150 mg daily.  Renew hydroxyzine has been helping her anxiety and did not make her feel tired or drowsy.  Patient declined referral for counseling.

## 2025-02-11 NOTE — ASSESSMENT & PLAN NOTE
Advised her to wear compression stocking.  Increase oral water intake.  Drink water sports drink.  To go into spine position whenever she feels like she is about to faint.  She will also inform her neurologist about that brief syncope episode.  Neuroexam normal and no need to do any CT head, she has slight tenderness of the right frontal bone with palpation, monitor symptoms.

## 2025-02-11 NOTE — PROGRESS NOTES
"Subjective   Patient ID: Charlotte Crain is a 34 y.o. female who presents for Headache (She passed out yesterday and hit her forehead into her desk, it hurts on the right side of her temple).    Patient is here accompanied by her mother for follow-up, to go over her lab result, discussed her anxiety medication, she also passed out yesterday while at work, sitting on her desk in the emergency room and did hit her right side frontal area against a desk, no loss of consciousness, all of a sudden she felt she is about to faint and was out for few seconds, her coworker did not notice any seizure activity, no  sphincter incontinence and no tongue biting, patient did not feel that day, she was feeling hot and \"not right\", she told me that her BP was normal and her Accu-Chek was normal in the ER by her coworker but was not seen officially by the ER physician, she is known to have history of POTS, migraine, she was seen recently by neurologist Dr. Hinson and her Mestinon was increased up to 60 mg twice daily, she was kept on the rest of the medication as before.  Also in the past she saw cardiologist Dr. Adames and was advised to lie down immediately whenever she feels faint.  She denies any fever chills cough palpitation chest pain diaphoresis.  She was having respiratory infection over the last few months, all resolved.  She told me that she used to take hydroxyzine every day did not make her tired she needs refill on it she is completely out.  She has been on sertraline 100 mg daily.  She also complained of inability to lose weight she is currently at 330 pound even though she eat healthy and exercise regularly.  We discussed her lab no significant abnormalities  A1c 5.9         Review of Systems    As mentioned on HPI otherwise it is negative.    Objective   /82 (BP Location: Right arm, Patient Position: Standing)   Pulse 70   Temp 36.4 °C (97.6 °F) (Temporal)   Ht 1.803 m (5' 11\")   Wt 150 kg (330 lb)   " SpO2 98%   BMI 46.03 kg/m²     Physical Exam  Vitals reviewed.   Constitutional:       General: She is not in acute distress.     Appearance: Normal appearance. She is obese.      Comments: No orthostatic hypotension   HENT:      Head: Normocephalic.      Comments: Minimal swelling in the right frontal bone where she hit her head yesterday slightly tender to palpation.     Right Ear: Tympanic membrane, ear canal and external ear normal.      Left Ear: Tympanic membrane, ear canal and external ear normal.      Mouth/Throat:      Mouth: Mucous membranes are moist.   Eyes:      General: No scleral icterus.     Extraocular Movements: Extraocular movements intact.      Conjunctiva/sclera: Conjunctivae normal.      Pupils: Pupils are equal, round, and reactive to light.   Neck:      Vascular: No carotid bruit.   Cardiovascular:      Rate and Rhythm: Normal rate and regular rhythm.      Pulses: Normal pulses.      Heart sounds: Normal heart sounds. No murmur heard.  Pulmonary:      Effort: Pulmonary effort is normal. No respiratory distress.      Breath sounds: Normal breath sounds. No wheezing.   Abdominal:      General: Abdomen is flat. Bowel sounds are normal.      Palpations: Abdomen is soft.      Tenderness: There is no right CVA tenderness or left CVA tenderness.   Musculoskeletal:         General: Normal range of motion.      Cervical back: Normal range of motion and neck supple.      Right lower leg: No edema.      Left lower leg: No edema.   Lymphadenopathy:      Cervical: No cervical adenopathy.   Skin:     General: Skin is warm.   Neurological:      General: No focal deficit present.      Mental Status: She is alert and oriented to person, place, and time.      Cranial Nerves: No cranial nerve deficit.      Gait: Gait normal.      Deep Tendon Reflexes: Reflexes normal.   Psychiatric:         Mood and Affect: Mood normal.         Assessment/Plan   Problem List Items Addressed This Visit             ICD-10-CM     Generalized anxiety disorder - Primary F41.1     Will increase sertraline up to 150 mg daily.  Renew hydroxyzine has been helping her anxiety and did not make her feel tired or drowsy.  Patient declined referral for counseling.         Relevant Medications    sertraline (Zoloft) 50 mg tablet    hydrOXYzine HCL (Atarax) 25 mg tablet    Postural orthostatic tachycardia syndrome G90.A     Seen by neurologist.         Postural dizziness R42     She had tilt table exam in the past.  Seen by neurologist.  Continue same medication.         Vasovagal episode R55     Advised her to wear compression stocking.  Increase oral water intake.  Drink water sports drink.  To go into spine position whenever she feels like she is about to faint.  She will also inform her neurologist about that brief syncope episode.  Neuroexam normal and no need to do any CT head, she has slight tenderness of the right frontal bone with palpation, monitor symptoms.         Obesity, Class III, BMI 40-49.9 (morbid obesity) (Multi) E66.01     Patient has tried several diet and exercise unable to lose weight will refer to [facility] pharmacist for weight management.           Obesity E66.9    Relevant Orders    Referral to Clinical Pharmacy    Elevated hemoglobin A1c R73.09     Follow low-carb diet.  Recheck A1c and Accu-Chek in 6 months out of physical.

## 2025-02-18 DIAGNOSIS — J11.1 INFLUENZA: Primary | ICD-10-CM

## 2025-02-18 RX ORDER — OSELTAMIVIR PHOSPHATE 75 MG/1
75 CAPSULE ORAL 2 TIMES DAILY
Qty: 10 CAPSULE | Refills: 0 | Status: SHIPPED | OUTPATIENT
Start: 2025-02-18 | End: 2025-02-23

## 2025-02-19 DIAGNOSIS — J11.1 INFLUENZA: Primary | ICD-10-CM

## 2025-02-19 RX ORDER — ALBUTEROL SULFATE 90 UG/1
2 INHALANT RESPIRATORY (INHALATION) EVERY 6 HOURS PRN
Qty: 18 G | Refills: 0 | Status: SHIPPED | OUTPATIENT
Start: 2025-02-19

## 2025-02-19 RX ORDER — HYDROCODONE BITARTRATE AND HOMATROPINE METHYLBROMIDE ORAL SOLUTION 5; 1.5 MG/5ML; MG/5ML
5 LIQUID ORAL EVERY 6 HOURS PRN
Qty: 100 ML | Refills: 0 | Status: SHIPPED | OUTPATIENT
Start: 2025-02-19 | End: 2025-02-24

## 2025-03-25 DIAGNOSIS — F41.1 GENERALIZED ANXIETY DISORDER: ICD-10-CM

## 2025-03-25 RX ORDER — SERTRALINE HYDROCHLORIDE 100 MG/1
100 TABLET, FILM COATED ORAL DAILY
Qty: 90 TABLET | Refills: 0 | Status: SHIPPED | OUTPATIENT
Start: 2025-03-25

## 2025-03-29 DIAGNOSIS — I95.1 ORTHOSTATIC HYPOTENSION: ICD-10-CM

## 2025-03-29 RX ORDER — FLUDROCORTISONE ACETATE 0.1 MG/1
0.1 TABLET ORAL DAILY
Qty: 90 TABLET | Refills: 0 | Status: SHIPPED | OUTPATIENT
Start: 2025-03-29 | End: 2026-03-29

## 2025-04-16 DIAGNOSIS — G43.909 MIGRAINE, UNSPECIFIED, NOT INTRACTABLE, WITHOUT STATUS MIGRAINOSUS: ICD-10-CM

## 2025-04-16 RX ORDER — AMITRIPTYLINE HYDROCHLORIDE 10 MG/1
TABLET, FILM COATED ORAL
Qty: 270 TABLET | Refills: 3 | Status: SHIPPED | OUTPATIENT
Start: 2025-04-16

## 2025-04-26 DIAGNOSIS — I95.1 ORTHOSTATIC HYPOTENSION: ICD-10-CM

## 2025-04-27 RX ORDER — FLUDROCORTISONE ACETATE 0.1 MG/1
0.1 TABLET ORAL DAILY
Qty: 90 TABLET | Refills: 0 | Status: SHIPPED | OUTPATIENT
Start: 2025-04-27 | End: 2026-04-27

## 2025-04-29 ENCOUNTER — TELEPHONE (OUTPATIENT)
Dept: OBSTETRICS AND GYNECOLOGY | Facility: CLINIC | Age: 35
End: 2025-04-29
Payer: COMMERCIAL

## 2025-05-19 ENCOUNTER — APPOINTMENT (OUTPATIENT)
Dept: OBSTETRICS AND GYNECOLOGY | Facility: CLINIC | Age: 35
End: 2025-05-19
Payer: COMMERCIAL

## 2025-05-19 VITALS
DIASTOLIC BLOOD PRESSURE: 72 MMHG | WEIGHT: 293 LBS | HEIGHT: 72 IN | SYSTOLIC BLOOD PRESSURE: 126 MMHG | BODY MASS INDEX: 39.68 KG/M2

## 2025-05-19 DIAGNOSIS — Z01.419 WELL WOMAN EXAM: Primary | ICD-10-CM

## 2025-05-19 DIAGNOSIS — Z30.431 IUD CHECK UP: ICD-10-CM

## 2025-05-19 DIAGNOSIS — Z12.4 CERVICAL CANCER SCREENING: ICD-10-CM

## 2025-05-19 PROBLEM — I10 HYPERTENSION: Status: RESOLVED | Noted: 2024-04-16 | Resolved: 2025-05-19

## 2025-05-19 PROCEDURE — 1036F TOBACCO NON-USER: CPT | Performed by: OBSTETRICS & GYNECOLOGY

## 2025-05-19 PROCEDURE — 3008F BODY MASS INDEX DOCD: CPT | Performed by: OBSTETRICS & GYNECOLOGY

## 2025-05-19 PROCEDURE — 99395 PREV VISIT EST AGE 18-39: CPT | Performed by: OBSTETRICS & GYNECOLOGY

## 2025-05-19 RX ORDER — LEVONORGESTREL 52 MG/1
1 INTRAUTERINE DEVICE INTRAUTERINE ONCE
COMMUNITY

## 2025-05-19 ASSESSMENT — ENCOUNTER SYMPTOMS
RESPIRATORY NEGATIVE: 1
MUSCULOSKELETAL NEGATIVE: 1
CARDIOVASCULAR NEGATIVE: 1
PSYCHIATRIC NEGATIVE: 1
NEUROLOGICAL NEGATIVE: 1
CONSTITUTIONAL NEGATIVE: 1
GASTROINTESTINAL NEGATIVE: 1

## 2025-05-19 NOTE — PROGRESS NOTES
"Subjective   Charlotte Crain is a 35 y.o. female who is here for a routine exam. Patient amenorrheic on Mirena. Cyclic symptoms include none. No intermenstrual bleeding, spotting, or discharge. Patient denies dyspareunia.    Current contraception: IUD  History of abnormal Pap smear: no  Family history of uterine or ovarian cancer: no  Regular self breast exam: yes  History of abnormal mammogram: no  Family history of breast cancer: no  History of abnormal lipids: no  Menstrual History:  OB History          0    Para   0    Term   0       0    AB   0    Living   0         SAB   0    IAB   0    Ectopic   0    Multiple   0    Live Births   0                  No LMP recorded. (Menstrual status: IUD).         Review of Systems   Constitutional: Negative.    Respiratory: Negative.     Cardiovascular: Negative.    Gastrointestinal: Negative.    Genitourinary: Negative.    Musculoskeletal: Negative.    Neurological: Negative.    Psychiatric/Behavioral: Negative.         Objective   /72   Ht 1.816 m (5' 11.5\")   Wt 139 kg (306 lb)   BMI 42.08 kg/m²     General:   alert, appears stated age, and cooperative   Heart: regular rate and rhythm, S1, S2 normal, no murmur, click, rub or gallop   Lungs: clear to auscultation bilaterally   Abdomen: soft, non-tender, without masses or organomegaly   Pelvic: Vulva normal in appearance without discoloration, rashes, lesions. Urethral meatus normal in appearance, without masses. Vagina is negative for blood, discharge, lesions. Uterus is small, mobile, non tender. There is no cervical motion tenderness, adnexal masses/tenderness. Perineum and anus with normal architecture and without rashes, lesions, discoloration.     Breast: No masses, skin changes, discoloration, tenderness, or nipple drainage bilaterally     Neck: Normal ROM. Thyroid normal size. No masses/tenderness     Lymph: No LAD   Psych: Normal mood/affect     Assessment/Plan   Problem List Items " Addressed This Visit    None  Visit Diagnoses         Well woman exam    -  Primary      Cervical cancer screening        Relevant Orders    THINPREP PAP      IUD check up              1. Pelvic/breast exam wnl, counseled on breast awareness/self exam  2. Pap pending.  3. IUD in correct position without complication  4. Counseled on safe sex practices including condom use. Offered screening for GN/CT, HIV, Syphilis. Patient declines    RTO 1 year  Cherelle Hummel, DO

## 2025-05-19 NOTE — PATIENT INSTRUCTIONS
Routine Gynecologic Visit:  You were seen today for a routine gyn visit with normal findings on exam  You were due for a pap smear today. You should still continue to get annual breast and pelvic exams in the office.  Continue self breast exams/monitoring at home and call for appointment in the office if there are ever masses, skin discoloration, dimpling/puckering of the skin, or nipple drainage when you are not pregnant  We discussed contraception today and your IUD is in place. Continue to use condoms with any new partners to protect against STD's and have routine STD screening done at your gynecologic visits if you have had a new partner in the last year or have new symptoms of pelvic pain, discharge, vulvar rashes. STD screening was not done today  If you are having any gynecologic issues in the next year you should call the office. [phone] ([place]) or [phone] ([place])

## 2025-06-12 LAB
CYTOLOGY CMNT CVX/VAG CYTO-IMP: NORMAL
HPV HR 12 DNA GENITAL QL NAA+PROBE: POSITIVE
HPV HR GENOTYPES PNL CVX NAA+PROBE: POSITIVE
HPV16 DNA SPEC QL NAA+PROBE: NEGATIVE
HPV18 DNA SPEC QL NAA+PROBE: NEGATIVE
LAB AP CONTRACEPTIVE HISTORY: NORMAL
LAB AP HPV GENOTYPE QUESTION: YES
LAB AP HPV HR: NORMAL
LABORATORY COMMENT REPORT: NORMAL
PATH REPORT.TOTAL CANCER: NORMAL

## 2025-06-13 DIAGNOSIS — F41.1 GENERALIZED ANXIETY DISORDER: ICD-10-CM

## 2025-06-13 RX ORDER — SERTRALINE HYDROCHLORIDE 100 MG/1
100 TABLET, FILM COATED ORAL DAILY
Qty: 90 TABLET | Refills: 0 | Status: SHIPPED | OUTPATIENT
Start: 2025-06-13

## 2025-07-03 ENCOUNTER — OFFICE VISIT (OUTPATIENT)
Dept: URGENT CARE | Age: 35
End: 2025-07-03
Payer: COMMERCIAL

## 2025-07-03 VITALS
SYSTOLIC BLOOD PRESSURE: 136 MMHG | TEMPERATURE: 98.6 F | OXYGEN SATURATION: 100 % | DIASTOLIC BLOOD PRESSURE: 85 MMHG | HEART RATE: 94 BPM | RESPIRATION RATE: 18 BRPM

## 2025-07-03 DIAGNOSIS — J01.00 ACUTE NON-RECURRENT MAXILLARY SINUSITIS: Primary | ICD-10-CM

## 2025-07-03 DIAGNOSIS — J02.9 PHARYNGITIS, UNSPECIFIED ETIOLOGY: ICD-10-CM

## 2025-07-03 LAB
POC HUMAN RHINOVIRUS PCR: NEGATIVE
POC INFLUENZA A VIRUS PCR: NEGATIVE
POC INFLUENZA B VIRUS PCR: NEGATIVE
POC RAPID MONO: NEGATIVE
POC RESPIRATORY SYNCYTIAL VIRUS PCR: NEGATIVE
POC SARS-COV-2 AG BINAX: NORMAL
POC STREPTOCOCCUS PYOGENES (GROUP A STREP) PCR: NEGATIVE

## 2025-07-03 RX ORDER — CEFDINIR 300 MG/1
300 CAPSULE ORAL 2 TIMES DAILY
Qty: 20 CAPSULE | Refills: 0 | Status: SHIPPED | OUTPATIENT
Start: 2025-07-03 | End: 2025-07-13

## 2025-07-03 NOTE — PROGRESS NOTES
Subjective   Patient ID: Charlotte Crain is a 35 y.o. female. She presents today with a chief complaint of Illness (Entered by patient) and Sore Throat (X5 days).    History of Present Illness  Subjective  Charlotte Crain is a 35 y.o. female who presents for evaluation of symptoms of a URI. Symptoms include left ear pain and sore throat. Onset of symptoms was 5 days ago and has been gradually worsening since that time. She had 1 day of fever and chills as well. Treatment to date: none. She requests Mono testing today.      Assessment/Plan      Discussed diagnosis and treatment of URI.  Suggested symptomatic OTC remedies.  Nasal saline spray for congestion.  Follow up as needed.            Past Medical History  Allergies as of 07/03/2025 - Reviewed 07/03/2025   Allergen Reaction Noted    Ondansetron hcl Unknown 04/14/2023    Amoxicillin Rash 05/08/2016       Prescriptions Prior to Admission[1]     Medical History[2]    Surgical History[3]     reports that she has never smoked. She has never used smokeless tobacco. She reports current alcohol use of about 1.0 standard drink of alcohol per week. She reports that she does not use drugs.    Review of Systems  Review of Systems                               Objective    There were no vitals filed for this visit.  No LMP recorded. (Menstrual status: IUD).    Physical Exam  Vitals and nursing note reviewed.   Constitutional:       General: She is not in acute distress.     Appearance: Normal appearance. She is normal weight. She is not toxic-appearing.   HENT:      Right Ear: Tympanic membrane, ear canal and external ear normal.      Left Ear: Tympanic membrane, ear canal and external ear normal.      Nose: Rhinorrhea present.      Mouth/Throat:      Mouth: Mucous membranes are moist.      Pharynx: Oropharyngeal exudate present.   Eyes:      Extraocular Movements: Extraocular movements intact.      Conjunctiva/sclera: Conjunctivae normal.      Pupils: Pupils are equal,  round, and reactive to light.   Cardiovascular:      Rate and Rhythm: Normal rate and regular rhythm.      Pulses: Normal pulses.      Heart sounds: Normal heart sounds.   Pulmonary:      Effort: Pulmonary effort is normal.      Breath sounds: Normal breath sounds. No wheezing, rhonchi or rales.   Musculoskeletal:      Cervical back: Normal range of motion and neck supple. No rigidity or tenderness.   Lymphadenopathy:      Cervical: No cervical adenopathy.   Neurological:      Mental Status: She is alert.         Procedures    Point of Care Test & Imaging Results from this visit  No results found for this visit on 07/03/25.   Imaging  No results found.    Cardiology, Vascular, and Other Imaging  No other imaging results found for the past 2 days      Diagnostic study results (if any) were reviewed by Chloe Alegre MD.    Assessment/Plan   Allergies, medications, history, and pertinent labs/EKGs/Imaging reviewed by Chloe Alegre MD.     Medical Decision Making      Orders and Diagnoses  There are no diagnoses linked to this encounter.    Medical Admin Record      Patient disposition: Home    Electronically signed by Chloe Alegre MD  8:16 AM           [1] (Not in a hospital admission)  [2]   Past Medical History:  Diagnosis Date    Acute frontal sinusitis, unspecified 06/03/2013    Acute frontal sinusitis    Acute pharyngitis, unspecified 06/11/2013    Sore throat    Allergic     Anxiety     Encounter for insertion of intrauterine contraceptive device     Encounter for insertion of mirena IUD    Encounter for other preprocedural examination 01/29/2015    Preop testing    Endometriosis, unspecified 03/01/2022    Endometriosis    Irregular menstruation, unspecified 11/16/2012    Irregular menstrual cycle    Other conditions influencing health status     Pott's Disease    Pain, unspecified 04/22/2013    Acute pain    Personal history of diseases of the blood and blood-forming organs and certain disorders involving  the immune mechanism 03/25/2014    History of thrombocytopenia    Personal history of diseases of the skin and subcutaneous tissue 11/16/2012    History of folliculitis    Personal history of other diseases of the nervous system and sense organs 10/08/2015    History of migraine    Personal history of other diseases of the respiratory system 01/17/2014    History of upper respiratory infection    Personal history of other diseases of the respiratory system 08/23/2022    History of sore throat    Personal history of other diseases of the respiratory system     History of sore throat    Personal history of other infectious and parasitic diseases     History of infectious mononucleosis    Personal history of other specified conditions 09/09/2016    History of headache   [3]   Past Surgical History:  Procedure Laterality Date    ANKLE FRACTURE SURGERY  04/2009    ANKLE SURGERY  11/16/2012    Ankle Surgery    ELBOW SURGERY  11/16/2012    Elbow Surgery    LAPAROSCOPY DIAGNOSTIC / BIOPSY / ASPIRATION / LYSIS  03/01/2016    Laparoscopy (Diagnostic)    TONSILLECTOMY  11/16/2012    Tonsillectomy

## 2025-07-21 ENCOUNTER — APPOINTMENT (OUTPATIENT)
Dept: NEUROLOGY | Facility: CLINIC | Age: 35
End: 2025-07-21
Payer: COMMERCIAL

## 2025-07-21 VITALS
TEMPERATURE: 97.7 F | BODY MASS INDEX: 39.68 KG/M2 | DIASTOLIC BLOOD PRESSURE: 64 MMHG | HEIGHT: 72 IN | SYSTOLIC BLOOD PRESSURE: 102 MMHG | HEART RATE: 83 BPM | WEIGHT: 293 LBS

## 2025-07-21 DIAGNOSIS — G43.809 OTHER MIGRAINE WITHOUT STATUS MIGRAINOSUS, NOT INTRACTABLE: ICD-10-CM

## 2025-07-21 DIAGNOSIS — G90.A POTS (POSTURAL ORTHOSTATIC TACHYCARDIA SYNDROME): Primary | ICD-10-CM

## 2025-07-21 PROCEDURE — 3008F BODY MASS INDEX DOCD: CPT | Performed by: PSYCHIATRY & NEUROLOGY

## 2025-07-21 PROCEDURE — 1036F TOBACCO NON-USER: CPT | Performed by: PSYCHIATRY & NEUROLOGY

## 2025-07-21 PROCEDURE — 99213 OFFICE O/P EST LOW 20 MIN: CPT | Performed by: PSYCHIATRY & NEUROLOGY

## 2025-07-21 RX ORDER — METFORMIN HYDROCHLORIDE 500 MG/1
1 TABLET ORAL
COMMUNITY
Start: 2025-07-11

## 2025-07-21 RX ORDER — TOPIRAMATE 25 MG/1
1 TABLET, FILM COATED ORAL
COMMUNITY
Start: 2025-07-11

## 2025-07-21 ASSESSMENT — PATIENT HEALTH QUESTIONNAIRE - PHQ9
SUM OF ALL RESPONSES TO PHQ9 QUESTIONS 1 & 2: 0
1. LITTLE INTEREST OR PLEASURE IN DOING THINGS: NOT AT ALL
2. FEELING DOWN, DEPRESSED OR HOPELESS: NOT AT ALL

## 2025-07-21 ASSESSMENT — LIFESTYLE VARIABLES
HOW OFTEN DO YOU HAVE A DRINK CONTAINING ALCOHOL: 2-4 TIMES A MONTH
HOW MANY STANDARD DRINKS CONTAINING ALCOHOL DO YOU HAVE ON A TYPICAL DAY: 1 OR 2
HOW OFTEN DO YOU HAVE SIX OR MORE DRINKS ON ONE OCCASION: NEVER
AUDIT-C TOTAL SCORE: 2
SKIP TO QUESTIONS 9-10: 1

## 2025-07-21 NOTE — PROGRESS NOTES
"Chief Complaint: POTs, Migraines    HPI  35 y.o. female presenting for follow up regarding above.    Since the last visit, her POTs are stable.  She denies any syncopal episodes.  She does note frequent lightheadedness.  She also notes 'flare-ups' where she feels like she is going to pass out.  This has happened twice since the last visit.  She is currently on Florinef 0.1 mg/day and Mestinon 30 mg BID.      Her migraines have increased to alteast once per week.  She describes a unilateral temporal throbbing pain with associated phonophobia, nausea, and vomiting. She takes Sumatriptan as a rescue medication - it breaks the migraine about 90% of the time.  She just started Topiramate.  She is on Amitriptyline 10 mg at bedtime.        Current Medications[1]      Objective:  /64 (BP Location: Left arm, Patient Position: Sitting, BP Cuff Size: Adult)   Pulse 83   Temp 36.5 °C (97.7 °F) (Temporal)   Ht 1.816 m (5' 11.5\")   Wt 138 kg (304 lb 9.6 oz)   BMI 41.89 kg/m²     Gen: NAD  Neuro:  --HIF: A&O X 3, repetition and naming intact  --CN:  PERRLA, EOMI, VFF, no visible facial asymmetry, facial sensation intact, no tongue or palatal deviation, SCM intact  --Motor: Moves all 4 extremities equally; no focal deficits  --Sensory: Intact to light touch, intact to pinprick  --Reflex: 2+ symmetric, toes down  --Cerebellum: FTN and HTS intact  --Gait: Normal, narrow based gait    Relevant Results      Assessment:    POTs - overall stable  - no syncopal episodes; she does note lightheadedness and 2 pre-syncopal episodes  - recommend continuing the following:   Mestinon 30 mg BID   Florinef 0.1 mg/day  - continue hydration and liberal salt intake    2.  Migraine - current migraine frequency is atleast once per week  - patient just re-started TPM  - continue TPM and Amitriptyline for now  - keep migraine diary  - if no improvement in migraine frequency, consider adding cGRP monoclonal ab      Follow up in 6 " months      Rodriguez Hinson MD  [facility]  Department of Neurology         [1]   Current Outpatient Medications:     albuterol 90 mcg/actuation inhaler, Inhale 2 puffs every 6 hours if needed for wheezing or shortness of breath. PRN, Disp: 18 g, Rfl: 0    amitriptyline (Elavil) 10 mg tablet, TAKE 3 TABLETS BY MOUTH AT NIGHT, Disp: 270 tablet, Rfl: 3    fludrocortisone (Florinef) 0.1 mg tablet, TAKE 1 TABLET (0.1 MG) BY MOUTH ONCE DAILY., Disp: 90 tablet, Rfl: 0    fluticasone (Flonase) 50 mcg/actuation nasal spray, SPRAY 2 SPRAYS INTO EACH NOSTRIL EVERY DAY, Disp: 48 mL, Rfl: 1    hydrOXYzine HCL (Atarax) 25 mg tablet, Take 1 tab BID as needed for anxiety, Disp: 180 tablet, Rfl: 3    levonorgestrel (Mirena) 20 mcg/24hr IUD, 52 mg by intrauterine route 1 time., Disp: , Rfl:     metFORMIN (Glucophage) 500 mg tablet, Take 1 tablet (500 mg) by mouth every 12 hours., Disp: , Rfl:     promethazine (Phenergan) 25 mg tablet, TAKE 1 TABLET BY MOUTH EVERY 6 TO 8 HOURS AS NEEDED FOR NAUSEA FOR 15 DAYS, Disp: , Rfl:     pyridostigmine (Mestinon) 60 mg tablet, TAKE 0.5 TABLET TWICE DAILY IF NEEDED, Disp: 90 tablet, Rfl: 3    sertraline (Zoloft) 100 mg tablet, TAKE 1 TABLET BY MOUTH EVERY DAY, Disp: 90 tablet, Rfl: 0    SUMAtriptan (Imitrex) 100 mg tablet, Take 1 tablet (100 mg) by mouth 1 time if needed for migraine (may repeat x1)., Disp: 9 tablet, Rfl: 5    topiramate (Topamax) 25 mg tablet, Take 1 tablet (25 mg) by mouth early in the morning.., Disp: , Rfl:

## 2025-08-12 ENCOUNTER — APPOINTMENT (OUTPATIENT)
Dept: PRIMARY CARE | Facility: CLINIC | Age: 35
End: 2025-08-12
Payer: COMMERCIAL

## 2025-08-12 VITALS
WEIGHT: 293 LBS | BODY MASS INDEX: 41.95 KG/M2 | DIASTOLIC BLOOD PRESSURE: 80 MMHG | HEART RATE: 91 BPM | SYSTOLIC BLOOD PRESSURE: 113 MMHG | OXYGEN SATURATION: 98 % | HEIGHT: 70 IN | TEMPERATURE: 97.4 F

## 2025-08-12 DIAGNOSIS — R74.01 ELEVATED ALT MEASUREMENT: ICD-10-CM

## 2025-08-12 DIAGNOSIS — R73.09 ELEVATED HEMOGLOBIN A1C: ICD-10-CM

## 2025-08-12 DIAGNOSIS — Z00.00 ANNUAL PHYSICAL EXAM: Primary | ICD-10-CM

## 2025-08-12 DIAGNOSIS — E66.813 OBESITY, CLASS III, BMI 40-49.9 (MORBID OBESITY): ICD-10-CM

## 2025-08-12 DIAGNOSIS — F41.1 GENERALIZED ANXIETY DISORDER: ICD-10-CM

## 2025-08-12 DIAGNOSIS — Z11.59 NEED FOR HEPATITIS C SCREENING TEST: ICD-10-CM

## 2025-08-12 PROCEDURE — 1036F TOBACCO NON-USER: CPT | Performed by: INTERNAL MEDICINE

## 2025-08-12 PROCEDURE — 3008F BODY MASS INDEX DOCD: CPT | Performed by: INTERNAL MEDICINE

## 2025-08-12 PROCEDURE — 99395 PREV VISIT EST AGE 18-39: CPT | Performed by: INTERNAL MEDICINE

## 2025-08-12 RX ORDER — SERTRALINE HYDROCHLORIDE 100 MG/1
100 TABLET, FILM COATED ORAL DAILY
Qty: 90 TABLET | Refills: 3 | Status: SHIPPED | OUTPATIENT
Start: 2025-08-12

## 2025-08-12 ASSESSMENT — ENCOUNTER SYMPTOMS
RESPIRATORY NEGATIVE: 1
GASTROINTESTINAL NEGATIVE: 1
EYES NEGATIVE: 1
HEMATOLOGIC/LYMPHATIC NEGATIVE: 1
NEUROLOGICAL NEGATIVE: 1
CARDIOVASCULAR NEGATIVE: 1
PSYCHIATRIC NEGATIVE: 1

## 2025-08-26 LAB
ALT SERPL-CCNC: 22 U/L (ref 6–29)
EST. AVERAGE GLUCOSE BLD GHB EST-MCNC: 105 MG/DL
EST. AVERAGE GLUCOSE BLD GHB EST-SCNC: 5.8 MMOL/L
HBA1C MFR BLD: 5.3 %
HCV AB SERPL QL IA: NORMAL

## 2026-01-21 ENCOUNTER — APPOINTMENT (OUTPATIENT)
Dept: NEUROLOGY | Facility: CLINIC | Age: 36
End: 2026-01-21
Payer: COMMERCIAL

## 2026-08-14 ENCOUNTER — APPOINTMENT (OUTPATIENT)
Dept: PRIMARY CARE | Facility: CLINIC | Age: 36
End: 2026-08-14
Payer: COMMERCIAL